# Patient Record
Sex: FEMALE | Race: BLACK OR AFRICAN AMERICAN | NOT HISPANIC OR LATINO
[De-identification: names, ages, dates, MRNs, and addresses within clinical notes are randomized per-mention and may not be internally consistent; named-entity substitution may affect disease eponyms.]

---

## 2017-03-05 ENCOUNTER — RX RENEWAL (OUTPATIENT)
Age: 72
End: 2017-03-05

## 2017-03-28 ENCOUNTER — APPOINTMENT (OUTPATIENT)
Dept: INTERNAL MEDICINE | Facility: CLINIC | Age: 72
End: 2017-03-28

## 2017-03-28 VITALS — SYSTOLIC BLOOD PRESSURE: 140 MMHG | DIASTOLIC BLOOD PRESSURE: 60 MMHG | HEART RATE: 76 BPM | RESPIRATION RATE: 12 BRPM

## 2017-03-28 VITALS — BODY MASS INDEX: 34.32 KG/M2 | WEIGHT: 206 LBS | HEIGHT: 65 IN

## 2017-03-28 DIAGNOSIS — E55.9 VITAMIN D DEFICIENCY, UNSPECIFIED: ICD-10-CM

## 2017-03-29 LAB
ALBUMIN SERPL ELPH-MCNC: 3.8 G/DL
ALP BLD-CCNC: 114 U/L
ALT SERPL-CCNC: 12 U/L
ANION GAP SERPL CALC-SCNC: 16 MMOL/L
AST SERPL-CCNC: 20 U/L
BASOPHILS # BLD AUTO: 0.01 K/UL
BASOPHILS NFR BLD AUTO: 0.2 %
BILIRUB DIRECT SERPL-MCNC: 0.1 MG/DL
BILIRUB INDIRECT SERPL-MCNC: 0.1 MG/DL
BILIRUB SERPL-MCNC: 0.2 MG/DL
BUN SERPL-MCNC: 22 MG/DL
CALCIUM SERPL-MCNC: 8.8 MG/DL
CHLORIDE SERPL-SCNC: 103 MMOL/L
CHOLEST SERPL-MCNC: 126 MG/DL
CHOLEST/HDLC SERPL: 3 RATIO
CO2 SERPL-SCNC: 22 MMOL/L
CREAT SERPL-MCNC: 0.84 MG/DL
EOSINOPHIL # BLD AUTO: 0.06 K/UL
EOSINOPHIL NFR BLD AUTO: 1.4 %
GLUCOSE SERPL-MCNC: 102 MG/DL
HBA1C MFR BLD HPLC: 6.4 %
HCT VFR BLD CALC: 39.4 %
HDLC SERPL-MCNC: 42 MG/DL
HGB BLD-MCNC: 12.2 G/DL
IMM GRANULOCYTES NFR BLD AUTO: 0.2 %
INR PPP: 2.88 RATIO
LDLC SERPL CALC-MCNC: 65 MG/DL
LYMPHOCYTES # BLD AUTO: 1.11 K/UL
LYMPHOCYTES NFR BLD AUTO: 25 %
MAN DIFF?: NORMAL
MCHC RBC-ENTMCNC: 26.9 PG
MCHC RBC-ENTMCNC: 31 GM/DL
MCV RBC AUTO: 87 FL
MONOCYTES # BLD AUTO: 0.4 K/UL
MONOCYTES NFR BLD AUTO: 9 %
NEUTROPHILS # BLD AUTO: 2.85 K/UL
NEUTROPHILS NFR BLD AUTO: 64.2 %
PLATELET # BLD AUTO: 190 K/UL
POTASSIUM SERPL-SCNC: 4.2 MMOL/L
PROT SERPL-MCNC: 6.4 G/DL
PT BLD: 33.3 SEC
RBC # BLD: 4.53 M/UL
RBC # FLD: 15.4 %
SODIUM SERPL-SCNC: 141 MMOL/L
TRIGL SERPL-MCNC: 96 MG/DL
WBC # FLD AUTO: 4.44 K/UL

## 2017-05-01 ENCOUNTER — APPOINTMENT (OUTPATIENT)
Dept: INTERNAL MEDICINE | Facility: CLINIC | Age: 72
End: 2017-05-01

## 2017-07-13 ENCOUNTER — APPOINTMENT (OUTPATIENT)
Dept: INTERNAL MEDICINE | Facility: CLINIC | Age: 72
End: 2017-07-13

## 2017-07-13 ENCOUNTER — RX RENEWAL (OUTPATIENT)
Age: 72
End: 2017-07-13

## 2017-07-13 VITALS — BODY MASS INDEX: 32.99 KG/M2 | WEIGHT: 198 LBS | HEIGHT: 65 IN

## 2017-07-13 VITALS — RESPIRATION RATE: 12 BRPM | DIASTOLIC BLOOD PRESSURE: 56 MMHG | SYSTOLIC BLOOD PRESSURE: 122 MMHG | HEART RATE: 80 BPM

## 2017-09-25 ENCOUNTER — APPOINTMENT (OUTPATIENT)
Dept: INTERNAL MEDICINE | Facility: CLINIC | Age: 72
End: 2017-09-25
Payer: COMMERCIAL

## 2017-09-25 VITALS — HEIGHT: 65 IN | WEIGHT: 200 LBS | BODY MASS INDEX: 33.32 KG/M2

## 2017-09-25 VITALS — RESPIRATION RATE: 12 BRPM | SYSTOLIC BLOOD PRESSURE: 130 MMHG | HEART RATE: 82 BPM | DIASTOLIC BLOOD PRESSURE: 56 MMHG

## 2017-09-25 LAB — INR PPP: 2.2 RATIO

## 2017-09-25 PROCEDURE — 85610 PROTHROMBIN TIME: CPT | Mod: QW

## 2017-09-25 PROCEDURE — 99214 OFFICE O/P EST MOD 30 MIN: CPT | Mod: 25

## 2017-10-08 ENCOUNTER — EMERGENCY (EMERGENCY)
Facility: HOSPITAL | Age: 72
LOS: 0 days | Discharge: HOME | End: 2017-10-08
Attending: EMERGENCY MEDICINE
Payer: MEDICARE

## 2017-10-08 VITALS
SYSTOLIC BLOOD PRESSURE: 145 MMHG | OXYGEN SATURATION: 99 % | RESPIRATION RATE: 17 BRPM | DIASTOLIC BLOOD PRESSURE: 62 MMHG | HEART RATE: 60 BPM | TEMPERATURE: 98 F

## 2017-10-08 VITALS
WEIGHT: 195.11 LBS | DIASTOLIC BLOOD PRESSURE: 71 MMHG | RESPIRATION RATE: 18 BRPM | TEMPERATURE: 99 F | HEIGHT: 65 IN | HEART RATE: 77 BPM | OXYGEN SATURATION: 99 % | SYSTOLIC BLOOD PRESSURE: 159 MMHG

## 2017-10-08 DIAGNOSIS — N39.0 URINARY TRACT INFECTION, SITE NOT SPECIFIED: ICD-10-CM

## 2017-10-08 DIAGNOSIS — R10.9 UNSPECIFIED ABDOMINAL PAIN: ICD-10-CM

## 2017-10-08 LAB
ALBUMIN SERPL ELPH-MCNC: 3.6 G/DL — SIGNIFICANT CHANGE UP (ref 3.3–5)
ALP SERPL-CCNC: 136 U/L — HIGH (ref 40–120)
ALT FLD-CCNC: 19 U/L — SIGNIFICANT CHANGE UP (ref 12–78)
AMYLASE P1 CFR SERPL: 133 U/L — HIGH (ref 25–115)
ANION GAP SERPL CALC-SCNC: 10 MMOL/L — SIGNIFICANT CHANGE UP (ref 5–17)
APPEARANCE UR: CLEAR — SIGNIFICANT CHANGE UP
APTT BLD: 32 SEC — SIGNIFICANT CHANGE UP (ref 27.5–37.4)
AST SERPL-CCNC: 21 U/L — SIGNIFICANT CHANGE UP (ref 15–37)
BACTERIA # UR AUTO: ABNORMAL
BASOPHILS # BLD AUTO: 0.1 K/UL — SIGNIFICANT CHANGE UP (ref 0–0.2)
BASOPHILS NFR BLD AUTO: 0.5 % — SIGNIFICANT CHANGE UP (ref 0–2)
BILIRUB SERPL-MCNC: 0.5 MG/DL — SIGNIFICANT CHANGE UP (ref 0.2–1.2)
BILIRUB UR-MCNC: NEGATIVE — SIGNIFICANT CHANGE UP
BUN SERPL-MCNC: 26 MG/DL — HIGH (ref 7–23)
CALCIUM SERPL-MCNC: 9.4 MG/DL — SIGNIFICANT CHANGE UP (ref 8.5–10.1)
CHLORIDE SERPL-SCNC: 102 MMOL/L — SIGNIFICANT CHANGE UP (ref 96–108)
CO2 SERPL-SCNC: 27 MMOL/L — SIGNIFICANT CHANGE UP (ref 22–31)
COLOR SPEC: YELLOW — SIGNIFICANT CHANGE UP
CREAT SERPL-MCNC: 1.43 MG/DL — HIGH (ref 0.5–1.3)
DIFF PNL FLD: ABNORMAL
EOSINOPHIL # BLD AUTO: 0 K/UL — SIGNIFICANT CHANGE UP (ref 0–0.5)
EOSINOPHIL NFR BLD AUTO: 0 % — SIGNIFICANT CHANGE UP (ref 0–6)
EPI CELLS # UR: SIGNIFICANT CHANGE UP
GLUCOSE SERPL-MCNC: 118 MG/DL — HIGH (ref 70–99)
GLUCOSE UR QL: NEGATIVE MG/DL — SIGNIFICANT CHANGE UP
HCT VFR BLD CALC: 40.7 % — SIGNIFICANT CHANGE UP (ref 34.5–45)
HGB BLD-MCNC: 13.1 G/DL — SIGNIFICANT CHANGE UP (ref 11.5–15.5)
INR BLD: 1.93 RATIO — HIGH (ref 0.88–1.16)
KETONES UR-MCNC: NEGATIVE — SIGNIFICANT CHANGE UP
LEUKOCYTE ESTERASE UR-ACNC: ABNORMAL
LIDOCAIN IGE QN: 272 U/L — SIGNIFICANT CHANGE UP (ref 73–393)
LYMPHOCYTES # BLD AUTO: 1.1 K/UL — SIGNIFICANT CHANGE UP (ref 1–3.3)
LYMPHOCYTES # BLD AUTO: 11.2 % — LOW (ref 13–44)
MCHC RBC-ENTMCNC: 27.7 PG — SIGNIFICANT CHANGE UP (ref 27–34)
MCHC RBC-ENTMCNC: 32.2 GM/DL — SIGNIFICANT CHANGE UP (ref 32–36)
MCV RBC AUTO: 85.9 FL — SIGNIFICANT CHANGE UP (ref 80–100)
MONOCYTES # BLD AUTO: 0.1 K/UL — SIGNIFICANT CHANGE UP (ref 0–0.9)
MONOCYTES NFR BLD AUTO: 1.3 % — LOW (ref 2–14)
NEUTROPHILS # BLD AUTO: 9.1 K/UL — HIGH (ref 1.8–7.4)
NEUTROPHILS NFR BLD AUTO: 86.8 % — HIGH (ref 43–77)
NITRITE UR-MCNC: NEGATIVE — SIGNIFICANT CHANGE UP
PH UR: 6.5 — SIGNIFICANT CHANGE UP (ref 5–8)
PLATELET # BLD AUTO: 234 K/UL — SIGNIFICANT CHANGE UP (ref 150–400)
POTASSIUM SERPL-MCNC: 4.1 MMOL/L — SIGNIFICANT CHANGE UP (ref 3.5–5.3)
POTASSIUM SERPL-SCNC: 4.1 MMOL/L — SIGNIFICANT CHANGE UP (ref 3.5–5.3)
PROT SERPL-MCNC: 8.4 GM/DL — HIGH (ref 6–8.3)
PROT UR-MCNC: 15 MG/DL
PROTHROM AB SERPL-ACNC: 21.3 SEC — HIGH (ref 9.8–12.7)
RBC # BLD: 4.74 M/UL — SIGNIFICANT CHANGE UP (ref 3.8–5.2)
RBC # FLD: 13.8 % — SIGNIFICANT CHANGE UP (ref 11–15)
RBC CASTS # UR COMP ASSIST: SIGNIFICANT CHANGE UP /HPF (ref 0–4)
SODIUM SERPL-SCNC: 139 MMOL/L — SIGNIFICANT CHANGE UP (ref 135–145)
SP GR SPEC: 1.01 — SIGNIFICANT CHANGE UP (ref 1.01–1.02)
UROBILINOGEN FLD QL: NEGATIVE MG/DL — SIGNIFICANT CHANGE UP
WBC # BLD: 10.1 K/UL — SIGNIFICANT CHANGE UP (ref 3.8–10.5)
WBC # FLD AUTO: 10.1 K/UL — SIGNIFICANT CHANGE UP (ref 3.8–10.5)
WBC UR QL: SIGNIFICANT CHANGE UP

## 2017-10-08 PROCEDURE — 74176 CT ABD & PELVIS W/O CONTRAST: CPT | Mod: 26

## 2017-10-08 PROCEDURE — 99285 EMERGENCY DEPT VISIT HI MDM: CPT

## 2017-10-08 RX ORDER — KETOROLAC TROMETHAMINE 30 MG/ML
30 SYRINGE (ML) INJECTION ONCE
Qty: 0 | Refills: 0 | Status: DISCONTINUED | OUTPATIENT
Start: 2017-10-08 | End: 2017-10-08

## 2017-10-08 RX ORDER — SODIUM CHLORIDE 9 MG/ML
1000 INJECTION INTRAMUSCULAR; INTRAVENOUS; SUBCUTANEOUS ONCE
Qty: 0 | Refills: 0 | Status: COMPLETED | OUTPATIENT
Start: 2017-10-08 | End: 2017-10-08

## 2017-10-08 RX ORDER — AZTREONAM 2 G
1 VIAL (EA) INJECTION
Qty: 20 | Refills: 0 | OUTPATIENT
Start: 2017-10-08 | End: 2017-10-18

## 2017-10-08 RX ADMIN — SODIUM CHLORIDE 1000 MILLILITER(S): 9 INJECTION INTRAMUSCULAR; INTRAVENOUS; SUBCUTANEOUS at 18:24

## 2017-10-08 RX ADMIN — Medication 30 MILLIGRAM(S): at 18:34

## 2017-10-08 RX ADMIN — Medication 1 TABLET(S): at 20:46

## 2017-10-08 NOTE — ED ADULT TRIAGE NOTE - CHIEF COMPLAINT QUOTE
pt complaining of left side flank pain radiating to abdomen as well as nausea and vomiting since this am. pt has history of recurring UTI and PE.

## 2017-10-08 NOTE — ED ADULT NURSE NOTE - OBJECTIVE STATEMENT
Patient alert stating that since this morning she has had this pain in her left side that radiates to the middle of her abdomen. States she had a PE in 2007 and is on coumadin ever since prophylactically.

## 2017-10-08 NOTE — ED PROVIDER NOTE - PROGRESS NOTE DETAILS
Pt endorsed by Dr. Conde present for eval of flank pain & has hx of freq UTI.  Pending CT. CT neg for obstructing stone.  Labs neg for acute pathology.  Pt received first dose of abx in ED.  Discussed results and outcome of testing with the patient, given copy as well.  Patient advised to please follow up with their primary care doctor within the next 24 hours and return to the Emergency Department for worsening symptoms or any other concerns.  Patient advised that their doctor may call  to follow up on the specific results of the tests performed today in the emergency department. Given urology follow up.

## 2017-10-08 NOTE — ED PROVIDER NOTE - OBJECTIVE STATEMENT
73 yo female with history of PE x10 years ago, htn presents with left flank pain since this morning. Patient states pain is 4/10 at present. Patient denies fever, chills, rash. Patient with mild dysuria. Patient states pain  radiates to left flank/abdominal area.

## 2017-10-08 NOTE — ED PROVIDER NOTE - CARE PLAN
Principal Discharge DX:	Flank pain Principal Discharge DX:	Flank pain  Secondary Diagnosis:	UTI (urinary tract infection)

## 2017-10-09 LAB
CULTURE RESULTS: SIGNIFICANT CHANGE UP
SPECIMEN SOURCE: SIGNIFICANT CHANGE UP

## 2017-10-10 ENCOUNTER — APPOINTMENT (OUTPATIENT)
Dept: INTERNAL MEDICINE | Facility: CLINIC | Age: 72
End: 2017-10-10
Payer: COMMERCIAL

## 2017-10-10 VITALS — RESPIRATION RATE: 12 BRPM | DIASTOLIC BLOOD PRESSURE: 60 MMHG | SYSTOLIC BLOOD PRESSURE: 152 MMHG | HEART RATE: 78 BPM

## 2017-10-10 VITALS — BODY MASS INDEX: 32.99 KG/M2 | HEIGHT: 65 IN | WEIGHT: 198 LBS

## 2017-10-10 DIAGNOSIS — N39.0 URINARY TRACT INFECTION, SITE NOT SPECIFIED: ICD-10-CM

## 2017-10-10 DIAGNOSIS — R60.9 EDEMA, UNSPECIFIED: ICD-10-CM

## 2017-10-10 PROCEDURE — 99214 OFFICE O/P EST MOD 30 MIN: CPT

## 2017-10-10 PROCEDURE — 85610 PROTHROMBIN TIME: CPT | Mod: QW

## 2017-10-11 ENCOUNTER — RESULT CHARGE (OUTPATIENT)
Age: 72
End: 2017-10-11

## 2017-10-11 LAB — INR PPP: 2 RATIO

## 2017-10-16 ENCOUNTER — APPOINTMENT (OUTPATIENT)
Dept: CT IMAGING | Facility: IMAGING CENTER | Age: 72
End: 2017-10-16

## 2018-05-15 ENCOUNTER — EMERGENCY (EMERGENCY)
Facility: HOSPITAL | Age: 73
LOS: 0 days | Discharge: ANOTHER HOSPITAL | End: 2018-05-15
Attending: EMERGENCY MEDICINE
Payer: MEDICARE

## 2018-05-15 ENCOUNTER — APPOINTMENT (OUTPATIENT)
Dept: INTERNAL MEDICINE | Facility: CLINIC | Age: 73
End: 2018-05-15

## 2018-05-15 ENCOUNTER — INPATIENT (INPATIENT)
Facility: HOSPITAL | Age: 73
LOS: 5 days | Discharge: HOME | DRG: 330 | End: 2018-05-21
Attending: SURGERY | Admitting: SURGERY
Payer: COMMERCIAL

## 2018-05-15 VITALS
HEIGHT: 65 IN | OXYGEN SATURATION: 97 % | RESPIRATION RATE: 18 BRPM | DIASTOLIC BLOOD PRESSURE: 73 MMHG | WEIGHT: 190.04 LBS | TEMPERATURE: 99 F | HEART RATE: 89 BPM | SYSTOLIC BLOOD PRESSURE: 170 MMHG

## 2018-05-15 VITALS
DIASTOLIC BLOOD PRESSURE: 58 MMHG | SYSTOLIC BLOOD PRESSURE: 155 MMHG | RESPIRATION RATE: 17 BRPM | TEMPERATURE: 98 F | HEART RATE: 77 BPM | OXYGEN SATURATION: 98 %

## 2018-05-15 VITALS
OXYGEN SATURATION: 97 % | SYSTOLIC BLOOD PRESSURE: 164 MMHG | TEMPERATURE: 99 F | RESPIRATION RATE: 15 BRPM | DIASTOLIC BLOOD PRESSURE: 71 MMHG | HEART RATE: 82 BPM

## 2018-05-15 DIAGNOSIS — E11.9 TYPE 2 DIABETES MELLITUS WITHOUT COMPLICATIONS: ICD-10-CM

## 2018-05-15 DIAGNOSIS — Z98.890 OTHER SPECIFIED POSTPROCEDURAL STATES: Chronic | ICD-10-CM

## 2018-05-15 LAB
ALBUMIN SERPL ELPH-MCNC: 3.5 G/DL — SIGNIFICANT CHANGE UP (ref 3.3–5)
ALBUMIN SERPL ELPH-MCNC: 3.8 G/DL — SIGNIFICANT CHANGE UP (ref 3.3–5)
ALP SERPL-CCNC: 134 U/L — HIGH (ref 40–120)
ALP SERPL-CCNC: 150 U/L — HIGH (ref 40–120)
ALT FLD-CCNC: 13 U/L — SIGNIFICANT CHANGE UP (ref 10–45)
ALT FLD-CCNC: 15 U/L — SIGNIFICANT CHANGE UP (ref 12–78)
ANION GAP SERPL CALC-SCNC: 12 MMOL/L — SIGNIFICANT CHANGE UP (ref 5–17)
ANION GAP SERPL CALC-SCNC: 20 MMOL/L — HIGH (ref 5–17)
APPEARANCE UR: CLEAR — SIGNIFICANT CHANGE UP
APTT BLD: 32.5 SEC — SIGNIFICANT CHANGE UP (ref 27.5–37.4)
APTT BLD: 37.3 SEC — SIGNIFICANT CHANGE UP (ref 27.5–37.4)
AST SERPL-CCNC: 18 U/L — SIGNIFICANT CHANGE UP (ref 15–37)
AST SERPL-CCNC: 26 U/L — SIGNIFICANT CHANGE UP (ref 10–40)
BASE EXCESS BLDA CALC-SCNC: -1.1 MMOL/L — SIGNIFICANT CHANGE UP (ref -2–2)
BASOPHILS # BLD AUTO: 0.02 K/UL — SIGNIFICANT CHANGE UP (ref 0–0.2)
BASOPHILS # BLD AUTO: 0.1 K/UL — SIGNIFICANT CHANGE UP (ref 0–0.2)
BASOPHILS NFR BLD AUTO: 0.2 % — SIGNIFICANT CHANGE UP (ref 0–2)
BASOPHILS NFR BLD AUTO: 1.2 % — SIGNIFICANT CHANGE UP (ref 0–2)
BILIRUB SERPL-MCNC: 0.4 MG/DL — SIGNIFICANT CHANGE UP (ref 0.2–1.2)
BILIRUB SERPL-MCNC: 0.5 MG/DL — SIGNIFICANT CHANGE UP (ref 0.2–1.2)
BILIRUB UR-MCNC: NEGATIVE — SIGNIFICANT CHANGE UP
BLD GP AB SCN SERPL QL: NEGATIVE — SIGNIFICANT CHANGE UP
BLOOD GAS COMMENTS: SIGNIFICANT CHANGE UP
BLOOD GAS COMMENTS: SIGNIFICANT CHANGE UP
BLOOD GAS SOURCE: SIGNIFICANT CHANGE UP
BUN SERPL-MCNC: 23 MG/DL — SIGNIFICANT CHANGE UP (ref 7–23)
BUN SERPL-MCNC: 25 MG/DL — HIGH (ref 7–23)
CALCIUM SERPL-MCNC: 9.1 MG/DL — SIGNIFICANT CHANGE UP (ref 8.5–10.1)
CALCIUM SERPL-MCNC: 9.5 MG/DL — SIGNIFICANT CHANGE UP (ref 8.4–10.5)
CHLORIDE SERPL-SCNC: 107 MMOL/L — SIGNIFICANT CHANGE UP (ref 96–108)
CHLORIDE SERPL-SCNC: 99 MMOL/L — SIGNIFICANT CHANGE UP (ref 96–108)
CK MB BLD-MCNC: 2.7 % — SIGNIFICANT CHANGE UP (ref 0–3.5)
CK MB CFR SERPL CALC: 1.6 NG/ML — SIGNIFICANT CHANGE UP (ref 0.5–3.6)
CK MB CFR SERPL CALC: 1.9 NG/ML — SIGNIFICANT CHANGE UP (ref 0.5–3.6)
CK SERPL-CCNC: 70 U/L — SIGNIFICANT CHANGE UP (ref 26–192)
CO2 SERPL-SCNC: 20 MMOL/L — LOW (ref 22–31)
CO2 SERPL-SCNC: 22 MMOL/L — SIGNIFICANT CHANGE UP (ref 22–31)
COLOR SPEC: YELLOW — SIGNIFICANT CHANGE UP
CREAT SERPL-MCNC: 1.22 MG/DL — SIGNIFICANT CHANGE UP (ref 0.5–1.3)
CREAT SERPL-MCNC: 1.25 MG/DL — SIGNIFICANT CHANGE UP (ref 0.5–1.3)
D DIMER BLD IA.RAPID-MCNC: 954 NG/ML DDU — HIGH
DIFF PNL FLD: ABNORMAL
EOSINOPHIL # BLD AUTO: 0 K/UL — SIGNIFICANT CHANGE UP (ref 0–0.5)
EOSINOPHIL # BLD AUTO: 0.02 K/UL — SIGNIFICANT CHANGE UP (ref 0–0.5)
EOSINOPHIL NFR BLD AUTO: 0.1 % — SIGNIFICANT CHANGE UP (ref 0–6)
EOSINOPHIL NFR BLD AUTO: 0.2 % — SIGNIFICANT CHANGE UP (ref 0–6)
EPI CELLS # UR: SIGNIFICANT CHANGE UP
GAS PNL BLDV: SIGNIFICANT CHANGE UP
GLUCOSE SERPL-MCNC: 117 MG/DL — HIGH (ref 70–99)
GLUCOSE SERPL-MCNC: 93 MG/DL — SIGNIFICANT CHANGE UP (ref 70–99)
GLUCOSE UR QL: NEGATIVE MG/DL — SIGNIFICANT CHANGE UP
HCO3 BLDA-SCNC: 23 MMOL/L — SIGNIFICANT CHANGE UP (ref 21–29)
HCT VFR BLD CALC: 40.7 % — SIGNIFICANT CHANGE UP (ref 34.5–45)
HCT VFR BLD CALC: 41.5 % — SIGNIFICANT CHANGE UP (ref 34.5–45)
HGB BLD-MCNC: 13.2 G/DL — SIGNIFICANT CHANGE UP (ref 11.5–15.5)
HGB BLD-MCNC: 13.4 G/DL — SIGNIFICANT CHANGE UP (ref 11.5–15.5)
HOROWITZ INDEX BLDA+IHG-RTO: 21 — SIGNIFICANT CHANGE UP
IMM GRANULOCYTES NFR BLD AUTO: 0.2 % — SIGNIFICANT CHANGE UP (ref 0–1.5)
INR BLD: 2.51 RATIO — HIGH (ref 0.88–1.16)
INR BLD: 3.51 RATIO — HIGH (ref 0.88–1.16)
KETONES UR-MCNC: ABNORMAL
LEUKOCYTE ESTERASE UR-ACNC: ABNORMAL
LYMPHOCYTES # BLD AUTO: 0.86 K/UL — LOW (ref 1–3.3)
LYMPHOCYTES # BLD AUTO: 1 K/UL — SIGNIFICANT CHANGE UP (ref 1–3.3)
LYMPHOCYTES # BLD AUTO: 10.5 % — LOW (ref 13–44)
LYMPHOCYTES # BLD AUTO: 12.5 % — LOW (ref 13–44)
MAGNESIUM SERPL-MCNC: 2.1 MG/DL — SIGNIFICANT CHANGE UP (ref 1.6–2.6)
MCHC RBC-ENTMCNC: 27.7 PG — SIGNIFICANT CHANGE UP (ref 27–34)
MCHC RBC-ENTMCNC: 28.2 PG — SIGNIFICANT CHANGE UP (ref 27–34)
MCHC RBC-ENTMCNC: 32.2 GM/DL — SIGNIFICANT CHANGE UP (ref 32–36)
MCHC RBC-ENTMCNC: 32.4 GM/DL — SIGNIFICANT CHANGE UP (ref 32–36)
MCV RBC AUTO: 85.3 FL — SIGNIFICANT CHANGE UP (ref 80–100)
MCV RBC AUTO: 87.5 FL — SIGNIFICANT CHANGE UP (ref 80–100)
MONOCYTES # BLD AUTO: 0.64 K/UL — SIGNIFICANT CHANGE UP (ref 0–0.9)
MONOCYTES # BLD AUTO: 0.8 K/UL — SIGNIFICANT CHANGE UP (ref 0–0.9)
MONOCYTES NFR BLD AUTO: 10.1 % — SIGNIFICANT CHANGE UP (ref 2–14)
MONOCYTES NFR BLD AUTO: 7.8 % — SIGNIFICANT CHANGE UP (ref 2–14)
NEUTROPHILS # BLD AUTO: 5.8 K/UL — SIGNIFICANT CHANGE UP (ref 1.8–7.4)
NEUTROPHILS # BLD AUTO: 6.6 K/UL — SIGNIFICANT CHANGE UP (ref 1.8–7.4)
NEUTROPHILS NFR BLD AUTO: 76 % — SIGNIFICANT CHANGE UP (ref 43–77)
NEUTROPHILS NFR BLD AUTO: 81.1 % — HIGH (ref 43–77)
NITRITE UR-MCNC: NEGATIVE — SIGNIFICANT CHANGE UP
NRBC # BLD: 0 /100 WBCS — SIGNIFICANT CHANGE UP (ref 0–0)
NT-PROBNP SERPL-SCNC: 506 PG/ML — HIGH (ref 0–450)
PCO2 BLDA: 38 MMHG — SIGNIFICANT CHANGE UP (ref 32–46)
PH BLD: 7.4 — SIGNIFICANT CHANGE UP (ref 7.35–7.45)
PH UR: 6 — SIGNIFICANT CHANGE UP (ref 5–8)
PLATELET # BLD AUTO: 194 K/UL — SIGNIFICANT CHANGE UP (ref 150–400)
PLATELET # BLD AUTO: 279 K/UL — SIGNIFICANT CHANGE UP (ref 150–400)
PO2 BLDA: 74 MMHG — SIGNIFICANT CHANGE UP (ref 74–108)
POTASSIUM SERPL-MCNC: 3.4 MMOL/L — LOW (ref 3.5–5.3)
POTASSIUM SERPL-MCNC: 3.9 MMOL/L — SIGNIFICANT CHANGE UP (ref 3.5–5.3)
POTASSIUM SERPL-SCNC: 3.4 MMOL/L — LOW (ref 3.5–5.3)
POTASSIUM SERPL-SCNC: 3.9 MMOL/L — SIGNIFICANT CHANGE UP (ref 3.5–5.3)
PROT SERPL-MCNC: 7.7 G/DL — SIGNIFICANT CHANGE UP (ref 6–8.3)
PROT SERPL-MCNC: 7.8 GM/DL — SIGNIFICANT CHANGE UP (ref 6–8.3)
PROT UR-MCNC: 30 MG/DL
PROTHROM AB SERPL-ACNC: 27.9 SEC — HIGH (ref 9.8–12.7)
PROTHROM AB SERPL-ACNC: 38.9 SEC — HIGH (ref 9.8–12.7)
RBC # BLD: 4.74 M/UL — SIGNIFICANT CHANGE UP (ref 3.8–5.2)
RBC # BLD: 4.77 M/UL — SIGNIFICANT CHANGE UP (ref 3.8–5.2)
RBC # FLD: 13.5 % — SIGNIFICANT CHANGE UP (ref 10.3–14.5)
RBC # FLD: 14.9 % — HIGH (ref 10.3–14.5)
RBC CASTS # UR COMP ASSIST: ABNORMAL /HPF (ref 0–4)
RH IG SCN BLD-IMP: POSITIVE — SIGNIFICANT CHANGE UP
RH IG SCN BLD-IMP: POSITIVE — SIGNIFICANT CHANGE UP
SAO2 % BLDA: 95 % — SIGNIFICANT CHANGE UP (ref 92–96)
SODIUM SERPL-SCNC: 139 MMOL/L — SIGNIFICANT CHANGE UP (ref 135–145)
SODIUM SERPL-SCNC: 141 MMOL/L — SIGNIFICANT CHANGE UP (ref 135–145)
SP GR SPEC: 1.01 — SIGNIFICANT CHANGE UP (ref 1.01–1.02)
TROPONIN I SERPL-MCNC: <.015 NG/ML — SIGNIFICANT CHANGE UP (ref 0.01–0.04)
TROPONIN I SERPL-MCNC: <.015 NG/ML — SIGNIFICANT CHANGE UP (ref 0.01–0.04)
UROBILINOGEN FLD QL: NEGATIVE MG/DL — SIGNIFICANT CHANGE UP
WBC # BLD: 7.6 K/UL — SIGNIFICANT CHANGE UP (ref 3.8–10.5)
WBC # BLD: 8.16 K/UL — SIGNIFICANT CHANGE UP (ref 3.8–10.5)
WBC # FLD AUTO: 7.6 K/UL — SIGNIFICANT CHANGE UP (ref 3.8–10.5)
WBC # FLD AUTO: 8.16 K/UL — SIGNIFICANT CHANGE UP (ref 3.8–10.5)
WBC UR QL: SIGNIFICANT CHANGE UP

## 2018-05-15 PROCEDURE — 99285 EMERGENCY DEPT VISIT HI MDM: CPT

## 2018-05-15 PROCEDURE — 99285 EMERGENCY DEPT VISIT HI MDM: CPT | Mod: 25

## 2018-05-15 PROCEDURE — 74176 CT ABD & PELVIS W/O CONTRAST: CPT | Mod: 26

## 2018-05-15 PROCEDURE — 93010 ELECTROCARDIOGRAM REPORT: CPT

## 2018-05-15 PROCEDURE — 71045 X-RAY EXAM CHEST 1 VIEW: CPT | Mod: 26

## 2018-05-15 PROCEDURE — 99053 MED SERV 10PM-8AM 24 HR FAC: CPT

## 2018-05-15 PROCEDURE — 71275 CT ANGIOGRAPHY CHEST: CPT | Mod: 26

## 2018-05-15 RX ORDER — PANTOPRAZOLE SODIUM 20 MG/1
40 TABLET, DELAYED RELEASE ORAL ONCE
Qty: 0 | Refills: 0 | Status: COMPLETED | OUTPATIENT
Start: 2018-05-15 | End: 2018-05-15

## 2018-05-15 RX ORDER — SODIUM CHLORIDE 9 MG/ML
1000 INJECTION INTRAMUSCULAR; INTRAVENOUS; SUBCUTANEOUS ONCE
Qty: 0 | Refills: 0 | Status: COMPLETED | OUTPATIENT
Start: 2018-05-15 | End: 2018-05-15

## 2018-05-15 RX ORDER — TRIAMTERENE 100 MG/1
0 CAPSULE ORAL
Qty: 0 | Refills: 0 | COMMUNITY

## 2018-05-15 RX ORDER — DEXTROSE 50 % IN WATER 50 %
15 SYRINGE (ML) INTRAVENOUS ONCE
Qty: 0 | Refills: 0 | Status: DISCONTINUED | OUTPATIENT
Start: 2018-05-15 | End: 2018-05-17

## 2018-05-15 RX ORDER — IOHEXOL 300 MG/ML
30 INJECTION, SOLUTION INTRAVENOUS ONCE
Qty: 0 | Refills: 0 | Status: COMPLETED | OUTPATIENT
Start: 2018-05-15 | End: 2018-05-15

## 2018-05-15 RX ORDER — GLUCAGON INJECTION, SOLUTION 0.5 MG/.1ML
1 INJECTION, SOLUTION SUBCUTANEOUS ONCE
Qty: 0 | Refills: 0 | Status: DISCONTINUED | OUTPATIENT
Start: 2018-05-15 | End: 2018-05-17

## 2018-05-15 RX ORDER — DEXTROSE 50 % IN WATER 50 %
12.5 SYRINGE (ML) INTRAVENOUS ONCE
Qty: 0 | Refills: 0 | Status: DISCONTINUED | OUTPATIENT
Start: 2018-05-15 | End: 2018-05-17

## 2018-05-15 RX ORDER — SODIUM CHLORIDE 9 MG/ML
1000 INJECTION, SOLUTION INTRAVENOUS
Qty: 0 | Refills: 0 | Status: DISCONTINUED | OUTPATIENT
Start: 2018-05-15 | End: 2018-05-16

## 2018-05-15 RX ORDER — SODIUM CHLORIDE 9 MG/ML
1000 INJECTION, SOLUTION INTRAVENOUS
Qty: 0 | Refills: 0 | Status: DISCONTINUED | OUTPATIENT
Start: 2018-05-15 | End: 2018-05-17

## 2018-05-15 RX ORDER — INSULIN LISPRO 100/ML
VIAL (ML) SUBCUTANEOUS EVERY 6 HOURS
Qty: 0 | Refills: 0 | Status: DISCONTINUED | OUTPATIENT
Start: 2018-05-15 | End: 2018-05-17

## 2018-05-15 RX ADMIN — PANTOPRAZOLE SODIUM 40 MILLIGRAM(S): 20 TABLET, DELAYED RELEASE ORAL at 20:15

## 2018-05-15 RX ADMIN — IOHEXOL 30 MILLILITER(S): 300 INJECTION, SOLUTION INTRAVENOUS at 10:54

## 2018-05-15 RX ADMIN — SODIUM CHLORIDE 100 MILLILITER(S): 9 INJECTION, SOLUTION INTRAVENOUS at 23:35

## 2018-05-15 RX ADMIN — SODIUM CHLORIDE 1000 MILLILITER(S): 9 INJECTION INTRAMUSCULAR; INTRAVENOUS; SUBCUTANEOUS at 20:15

## 2018-05-15 NOTE — ED PROVIDER NOTE - MEDICAL DECISION MAKING DETAILS
patient pw shortness of breath. though it is unlikely this is another pe, given patient reports identical symptoms. will scan her again. patient pw shortness of breath. though it is unlikely this is another pe, given patient reports identical symptoms. will scan her again. As interpreted by ED physician, ECG is NSR with normal intervals/axis, no changes in QRS, no ST/T changes.

## 2018-05-15 NOTE — ED ADULT NURSE NOTE - CHPI ED SYMPTOMS NEG
no burning urination/no nausea/no fever/no dysuria/no blood in stool/no hematuria/no diarrhea/no vomiting/no chills

## 2018-05-15 NOTE — ED PROVIDER NOTE - CARE PLAN
Principal Discharge DX:	Shortness of breath Principal Discharge DX:	Shortness of breath  Secondary Diagnosis:	Small bowel obstruction

## 2018-05-15 NOTE — ED ADULT NURSE NOTE - OBJECTIVE STATEMENT
72 yo transferred from [facility] for SBO. PMH of DM (II) & PE (2007). Pt reported to [facility] yesterday for SOB and abdominal pain. Pt reports "I have not eaten in 4 days". 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 Small pelvic and perihepatic ascites". Pt AAOx3, NAD, abdomen soft tender in the umbilical region, distended, strong peripheral pulses x 4, cap refill < 2 seconds, skin warm and dry. Pt denies headache, dizziness, chest pain, palpitations, cough, SOB, n/v/d, urinary symptoms, fevers, chills, weakness at this time. 20 guage in LAC placed prior to arrival, flushes with no difficulty, no blood return. 18 gauge established in the RAC. Pt awaiting medical evaluating by Surgical team. Safety & comfort measures maintained. Will continue to reassess.

## 2018-05-15 NOTE — H&P ADULT - ASSESSMENT
73 year old female with 2 days of abdominal distention, decreased BMs but passing flatus, WBC 8, lactate 2.1, CT showing distended colon from diverticular stricture or mass  - admit to colorectal surgery Dr Hutton for partial LBO  - NPO, IVF  - NGT if patient becomes nauseous  - Will monitor serial abd exams  - Trend WBC, lactate  - GI consult  - Check CEA  - D/w'ed fellow Shoaib Fitzgerald   Red Surgery 0930

## 2018-05-15 NOTE — ED PROVIDER NOTE - PHYSICAL EXAMINATION
Belkys Garcia M.D.:   patient awake alert NAD .   LUNGS CTAB no wheeze no crackle.   CARD RRR no m/r/g.    Abdomen soft mildly tender diffusely, distended no rebound no guarding no CVA tenderness.   EXT WWP no edema no calf tenderness CV 2+DP/PT bilaterally.   neuro A&Ox3 gait normal.    skin warm and dry no rash  HEENT: moist mucous membranes, PERRL, EOMI

## 2018-05-15 NOTE — ED PROVIDER NOTE - MEDICAL DECISION MAKING DETAILS
73F presenting as tranfer for LBO. hemodynamically stable at this time without N/V or signs of ischemic gut. will resend labs here as no lactate ever sent, will consult surgery and admit.

## 2018-05-15 NOTE — ED PROVIDER NOTE - OBJECTIVE STATEMENT
Belkys Garcia M.D: 73F hx DM, remote hx PE presenting as transfer from [place] for LBO. presented w/ SOBxdays similar to prior PE. ahd CTA which was negative. was reassessed and found to have periumbilical abd pain and abd distension and was rescanned and foundto have LBO. pt notes a few days of abd pain as well, diffuse, constant, assocaited with constipation- last BM 2 days ago. has been passing gas. notes some reflux but denies n/v. no f/c. Belkys Garcia M.D: 73F hx DM, remote hx PE presenting as transfer from [place] for LBO. presented w/ SOBxdays similar to prior PE. ahd CTA which was negative. was reassessed and found to have periumbilical abd pain and abd distension and was rescanned and foundto have LBO. pt notes a few days of abd pain as well, diffuse, constant, assocaited with constipation- last BM 2 days ago. has been passing gas. notes some reflux but denies n/v. no f/c.    Maik MRN: [id number]  CT under that MRN

## 2018-05-15 NOTE — H&P ADULT - FAMILY HISTORY
Father  Still living? Unknown  Family history of brain cancer, Age at diagnosis: Age Unknown     Mother  Still living? Unknown  Family history of breast cancer, Age at diagnosis: Age Unknown

## 2018-05-15 NOTE — H&P ADULT - ATTENDING COMMENTS
Pt seen and examined  P/w c/o abd distention. Last colonoscopy was 10 years ago. She has refused them since due to the prep. 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 She states that she had surgery for it as well as chemo and radiation therapy at [facility]. She usually has 3-4 BMs per day. She is currently passing gas.   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     Upon discussion with her PCP Dr. Samson and her son, neither of them have any knowledge of a cancer history or of any treatment for such. Her PE was presumed to be an unprovoked PE which is why she is currently on coumadin.     FamHx sig for her son with factor 5 leiden deficiency.     AAOx3  abd soft, distended, NT    CT with LBO at rectosigmoid stricture with fistulization to the adnexa.     A/P LBO - malignancy vs diverticular.   for scope today by GI  NPO, IVF  Heme eval for need for coumadin

## 2018-05-15 NOTE — H&P ADULT - HISTORY OF PRESENT ILLNESS
GENERAL SURGERY NOTE    CC: Patient is a 73 year old female with history of PE on coumadin who presents with a chief complaint of abdominal distention. She initially presented to an OSH with complaint of SOB; CTPA was negative. CT Abd/Pelvis was obtained to evaluate for finding of abdominal tenderness and distention which revealed a rectosigmoid stricture. The patient reports that she began to feel distended 2 days ago, associated with decreased appetite. She felt mildly nauseated without vomiting. Currently denies pain. Passing flatus. Last BM 2 days ago, though baseline is 3-4/day. Denies ever having any similar episodes in the past. Denies any history of diverticulosis or diverticulitis. Last c-scope was approx 2005, but she notes that she has "trouble" with c-scopes and tolerating the prep.       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      PSH  as above    10-point review of history otherwise negative unless indicated in HPI    FAMILY HISTORY  Father: brain cancer  Mother: breast cancer    MEDS  Coumadin 5 mg Mon-Thurs, 2.5 mg Fri-Sun    ALLERGIES  No Known Allergies or Intolerances

## 2018-05-15 NOTE — ED ADULT TRIAGE NOTE - CHIEF COMPLAINT QUOTE
pt c/o difficulty breathing, acid reflux, unable to eat in 2 days.  pt stated " I think I'm having a pulmonary embolism".  PMH - PE (2007)

## 2018-05-15 NOTE — ED PROVIDER NOTE - PROGRESS NOTE DETAILS
case discussed with surgery- will come see pt. Attending MD Munoz: Spoke with surgery, pending discussion with attending, will await final recs

## 2018-05-15 NOTE — ED ADULT TRIAGE NOTE - HEART RATE (BEATS/MIN)
Benefits, risks, and possible complications of procedure explained to patient/caregiver who verbalized understanding and gave verbal consent.
89

## 2018-05-15 NOTE — ED PROVIDER NOTE - ATTENDING CONTRIBUTION TO CARE
Attending MD Munoz: I personally have seen and examined this patient.  Resident note reviewed and agree on plan of care and except where noted.  See below for details.     73F with PMH including DM, PE not on AC presents to the ED transferred from [place] for large bowel obstruction.  Reports has been having shortness of breath, reports felt similar to prior PE.  CTA negative.  Had decreased appetite, periumbilical pain, abdominal distention and found to have LBO.  Reports abdominal pain diffuse, constant. Reports last BM was two days ago, +flatus. Reports mild nausea, denies vomiting.  Denies fevers, chills.  Denies chest pain.  On exam, head NCAT, PERRL, FROM at neck, no tenderness to palpation or stepoffs along length of spine, lungs CTAB with good inspiratory effort, +S1S2, no m/r/g, abdomen soft with +BS, +diffuse abdominal tenderness, +distention, no rebound, no guarding, no CVAT, moving all extremities; A/P: 73F with known LBO on CT, will send labs, keep npo, IVFs, consult surgery, admit

## 2018-05-15 NOTE — H&P ADULT - PMH
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

## 2018-05-15 NOTE — H&P ADULT - NSHPLABSRESULTS_GEN_ALL_CORE
Labs:                        13.4   7.6   )-----------( 194      ( 15 May 2018 20:13 )             41.5     05-15    139  |  99  |  25<H>  ----------------------------<  93  3.9   |  20<L>  |  1.22    Ca    9.5      15 May 2018 20:13    TPro  7.7  /  Alb  3.8  /  TBili  0.4  /  DBili  x   /  AST  26  /  ALT  13  /  AlkPhos  134<H>  05-15    PT/INR - ( 15 May 2018 20:13 )   PT: 38.9 sec;   INR: 3.51 ratio         PTT - ( 15 May 2018 20:13 )  PTT:37.3 sec          Imaging

## 2018-05-15 NOTE — ED ADULT NURSE REASSESSMENT NOTE - NS ED NURSE REASSESS COMMENT FT1
Surgery @ bedside evaluating the patient
Pt AAOx4, NAD, resp nonlabored, resting comfortably in bed. Pt denies headache, dizziness, chest pain, palpitations, SOB, abd pain, n/v/d, urinary symptoms, fevers, chills, weakness at this time. Pt awaiting transport to [address] [place] [address], bed [address] Door. Safety maintained.

## 2018-05-15 NOTE — ED PROVIDER NOTE - PROGRESS NOTE DETAILS
Pt signed out to me from dr medina, pt presented c/o sob x 1 day, pt has h/o PE and feels similar symptoms, ct angio results pending, ce #2 is pending, pt appears in no distress pt re-assessed, now c/o abdominal tenderness especially over the umbilical area, on exam, pt is tender with palpation over the umbilicus, pt has h/o a hernia, ct ordered to rule out incarceration dr dagmar higgins ct shows a large bowel obstruction, npo ordered dr troy called, case discussed, recommends pt be transferred for workup, he states pt may need a stent or colostomy and workup for malignancy transfer center called, case discussed with Dr Norman (general surgery on call), states she will call back and poor connection patient accepted by Dr Norman, ER to ER transfer, report given to Dr Harmon in the ER pt re-assessed, now c/o abdominal tenderness especially over the umbilical area, on exam, pt is tender with palpation over the umbilicus, states that she has not been able to eat since Saturday, pt has h/o a hernia, ct ordered to rule out incarceration

## 2018-05-15 NOTE — ED ADULT NURSE NOTE - OBJECTIVE STATEMENT
Patient presents in ED with lower abdominal pain 7/10,SOB, and nausea, for the past couple of weeks. It has since intensified. Hasd not been able to eat since 5/11

## 2018-05-15 NOTE — H&P ADULT - NSHPPHYSICALEXAM_GEN_ALL_CORE
T(C): 37.3 (05-15-18 @ 19:46), Max: 37.3 (05-15-18 @ 19:19)  HR: 82 (05-15-18 @ 19:46) (82 - 82)  BP: 156/66 (05-15-18 @ 19:46) (156/66 - 164/71)  RR: 16 (05-15-18 @ 19:46) (15 - 16)  SpO2: 97% (05-15-18 @ 19:46) (97% - 97%)  Wt(kg): --  Tmax: T(C): , Max: 37.3 (05-15-18 @ 19:19)  Wt(kg): --    Gen: NAD  HEENT: normocephalic, atraumatic, no scleral icterus  CV: S1, S2, RRR  Pulm: CTA B/L  Abd: Soft, obese, moderately distended, mildly tender, non-localized, no rebound, no guarding, no palpable organomegaly/masses  : grossly normal perineum  Ext: warm, no edema

## 2018-05-16 ENCOUNTER — TRANSCRIPTION ENCOUNTER (OUTPATIENT)
Age: 73
End: 2018-05-16

## 2018-05-16 ENCOUNTER — RESULT REVIEW (OUTPATIENT)
Age: 73
End: 2018-05-16

## 2018-05-16 DIAGNOSIS — I26.99 OTHER PULMONARY EMBOLISM WITHOUT ACUTE COR PULMONALE: ICD-10-CM

## 2018-05-16 DIAGNOSIS — Z79.01 LONG TERM (CURRENT) USE OF ANTICOAGULANTS: ICD-10-CM

## 2018-05-16 DIAGNOSIS — K56.609 UNSPECIFIED INTESTINAL OBSTRUCTION, UNSPECIFIED AS TO PARTIAL VERSUS COMPLETE OBSTRUCTION: ICD-10-CM

## 2018-05-16 DIAGNOSIS — R06.02 SHORTNESS OF BREATH: ICD-10-CM

## 2018-05-16 LAB
ANION GAP SERPL CALC-SCNC: 16 MMOL/L — SIGNIFICANT CHANGE UP (ref 5–17)
APTT BLD: 27.6 SEC — SIGNIFICANT CHANGE UP (ref 27.5–37.4)
APTT BLD: 35.4 SEC — SIGNIFICANT CHANGE UP (ref 27.5–37.4)
BUN SERPL-MCNC: 23 MG/DL — SIGNIFICANT CHANGE UP (ref 7–23)
CALCIUM SERPL-MCNC: 8.8 MG/DL — SIGNIFICANT CHANGE UP (ref 8.4–10.5)
CHLORIDE SERPL-SCNC: 104 MMOL/L — SIGNIFICANT CHANGE UP (ref 96–108)
CO2 SERPL-SCNC: 21 MMOL/L — LOW (ref 22–31)
CREAT SERPL-MCNC: 1.19 MG/DL — SIGNIFICANT CHANGE UP (ref 0.5–1.3)
GLUCOSE BLDC GLUCOMTR-MCNC: 88 MG/DL — SIGNIFICANT CHANGE UP (ref 70–99)
GLUCOSE BLDC GLUCOMTR-MCNC: 92 MG/DL — SIGNIFICANT CHANGE UP (ref 70–99)
GLUCOSE BLDC GLUCOMTR-MCNC: 95 MG/DL — SIGNIFICANT CHANGE UP (ref 70–99)
GLUCOSE BLDC GLUCOMTR-MCNC: 98 MG/DL — SIGNIFICANT CHANGE UP (ref 70–99)
GLUCOSE SERPL-MCNC: 85 MG/DL — SIGNIFICANT CHANGE UP (ref 70–99)
HBA1C BLD-MCNC: 5.7 % — HIGH (ref 4–5.6)
HCT VFR BLD CALC: 35.6 % — SIGNIFICANT CHANGE UP (ref 34.5–45)
HGB BLD-MCNC: 11.5 G/DL — SIGNIFICANT CHANGE UP (ref 11.5–15.5)
INR BLD: 2.19 RATIO — HIGH (ref 0.88–1.16)
INR BLD: 4.03 RATIO — HIGH (ref 0.88–1.16)
MAGNESIUM SERPL-MCNC: 2 MG/DL — SIGNIFICANT CHANGE UP (ref 1.6–2.6)
MCHC RBC-ENTMCNC: 27.7 PG — SIGNIFICANT CHANGE UP (ref 27–34)
MCHC RBC-ENTMCNC: 32.3 GM/DL — SIGNIFICANT CHANGE UP (ref 32–36)
MCV RBC AUTO: 85.8 FL — SIGNIFICANT CHANGE UP (ref 80–100)
PHOSPHATE SERPL-MCNC: 3 MG/DL — SIGNIFICANT CHANGE UP (ref 2.5–4.5)
PLATELET # BLD AUTO: 225 K/UL — SIGNIFICANT CHANGE UP (ref 150–400)
POTASSIUM SERPL-MCNC: 3.3 MMOL/L — LOW (ref 3.5–5.3)
POTASSIUM SERPL-SCNC: 3.3 MMOL/L — LOW (ref 3.5–5.3)
PROTHROM AB SERPL-ACNC: 24.3 SEC — HIGH (ref 9.8–12.7)
PROTHROM AB SERPL-ACNC: 46.8 SEC — HIGH (ref 10–13.1)
RBC # BLD: 4.15 M/UL — SIGNIFICANT CHANGE UP (ref 3.8–5.2)
RBC # FLD: 15.1 % — HIGH (ref 10.3–14.5)
SODIUM SERPL-SCNC: 141 MMOL/L — SIGNIFICANT CHANGE UP (ref 135–145)
WBC # BLD: 6.37 K/UL — SIGNIFICANT CHANGE UP (ref 3.8–10.5)
WBC # FLD AUTO: 6.37 K/UL — SIGNIFICANT CHANGE UP (ref 3.8–10.5)

## 2018-05-16 PROCEDURE — 45331 SIGMOIDOSCOPY AND BIOPSY: CPT | Mod: GC

## 2018-05-16 PROCEDURE — 88305 TISSUE EXAM BY PATHOLOGIST: CPT | Mod: 26

## 2018-05-16 PROCEDURE — 99223 1ST HOSP IP/OBS HIGH 75: CPT | Mod: 57

## 2018-05-16 RX ORDER — DEXTROSE MONOHYDRATE, SODIUM CHLORIDE, AND POTASSIUM CHLORIDE 50; .745; 4.5 G/1000ML; G/1000ML; G/1000ML
1000 INJECTION, SOLUTION INTRAVENOUS
Qty: 0 | Refills: 0 | Status: DISCONTINUED | OUTPATIENT
Start: 2018-05-16 | End: 2018-05-17

## 2018-05-16 RX ORDER — PROTHROMBIN COMPLEX CONCENTRATE (HUMAN) 25.5; 16.5; 24; 22; 22; 26 [IU]/ML; [IU]/ML; [IU]/ML; [IU]/ML; [IU]/ML; [IU]/ML
2000 POWDER, FOR SOLUTION INTRAVENOUS ONCE
Qty: 0 | Refills: 0 | Status: DISCONTINUED | OUTPATIENT
Start: 2018-05-16 | End: 2018-05-16

## 2018-05-16 RX ORDER — PHYTONADIONE (VIT K1) 5 MG
10 TABLET ORAL ONCE
Qty: 0 | Refills: 0 | Status: COMPLETED | OUTPATIENT
Start: 2018-05-16 | End: 2018-05-16

## 2018-05-16 RX ORDER — PHYTONADIONE (VIT K1) 5 MG
5 TABLET ORAL ONCE
Qty: 0 | Refills: 0 | Status: COMPLETED | OUTPATIENT
Start: 2018-05-16 | End: 2018-05-16

## 2018-05-16 RX ORDER — ACETAMINOPHEN 500 MG
1000 TABLET ORAL ONCE
Qty: 0 | Refills: 0 | Status: COMPLETED | OUTPATIENT
Start: 2018-05-16 | End: 2018-05-16

## 2018-05-16 RX ORDER — POTASSIUM CHLORIDE 20 MEQ
10 PACKET (EA) ORAL
Qty: 0 | Refills: 0 | Status: COMPLETED | OUTPATIENT
Start: 2018-05-16 | End: 2018-05-16

## 2018-05-16 RX ADMIN — DEXTROSE MONOHYDRATE, SODIUM CHLORIDE, AND POTASSIUM CHLORIDE 100 MILLILITER(S): 50; .745; 4.5 INJECTION, SOLUTION INTRAVENOUS at 18:44

## 2018-05-16 RX ADMIN — Medication 400 MILLIGRAM(S): at 20:37

## 2018-05-16 RX ADMIN — Medication 1000 MILLIGRAM(S): at 21:07

## 2018-05-16 RX ADMIN — Medication 101 MILLIGRAM(S): at 20:58

## 2018-05-16 RX ADMIN — Medication 100 MILLIEQUIVALENT(S): at 18:40

## 2018-05-16 RX ADMIN — Medication 100 MILLIEQUIVALENT(S): at 09:52

## 2018-05-16 RX ADMIN — Medication 102 MILLIGRAM(S): at 10:30

## 2018-05-16 RX ADMIN — Medication 100 MILLIEQUIVALENT(S): at 13:39

## 2018-05-16 NOTE — CONSULT NOTE ADULT - ASSESSMENT
Impression:  1. Abdominal pain/distension - due to large bowel obstruction - differential includes colon cancer, benign diverticular stricture, metastatic disease to the olon  2. Remote PE still on anticoagulation    Recommend:  -Will review films with radiology  -The patient will need colonoscopy with possible stent placement  -Given supertherapeutic INR, would reverse INR as we plan for possible procedure  -Keep NPO Impression:  1. Abdominal pain/distension - due to large bowel obstruction - differential includes colon cancer, benign diverticular stricture, metastatic disease to the colon, vs IBD given fistulas? seen on CT  2. Remote PE still on anticoagulation    Recommend:  -Will review films with radiology  -The patient will need colonoscopy with possible stent placement  -Given supertherapeutic INR, would reverse INR as we plan for possible procedure  -Keep NPO

## 2018-05-16 NOTE — CONSULT NOTE ADULT - PROBLEM SELECTOR RECOMMENDATION 9
Hold coumadin.   Vitamin K for INR reversal.   Heparin drip when subtherapeutic tulio-operatively.   Patient without active clot, unclear hypercoagulable state. Reasonable to keep anticoagulated in the setting of likely malignancy, but will discuss with PCP.

## 2018-05-16 NOTE — PROGRESS NOTE ADULT - SUBJECTIVE AND OBJECTIVE BOX
Surgery Red Team Progress Note    Presented with partial LBO, HD2    SUBJECTIVE:   Patient was seen and examined this morning. There was no acute event overnight. She is resting comfortably in bed. Pain is well controlled. Patient does not have flatus or bowel movement. She does not report pain, fever, n/v, chest pain, or shortness of breath.     OBJECTIVE:   T(C): 37.1 (05-16-18 @ 09:19), Max: 37.4 (05-15-18 @ 10:47)  HR: 81 (05-16-18 @ 09:19) (70 - 82)  BP: 162/71 (05-16-18 @ 09:19) (141/64 - 164/71)  RR: 18 (05-16-18 @ 09:19) (15 - 18)  SpO2: 96% (05-16-18 @ 09:19) (96% - 98%)  Wt(kg): --  CAPILLARY BLOOD GLUCOSE      POCT Blood Glucose.: 88 mg/dL (16 May 2018 06:07)  POCT Blood Glucose.: 92 mg/dL (16 May 2018 00:31)    I&O's Detail    15 May 2018 07:01  -  16 May 2018 07:00  --------------------------------------------------------  IN:    lactated ringers.: 850 mL  Total IN: 850 mL    OUT:    Voided: 200 mL  Total OUT: 200 mL    Total NET: 650 mL      16 May 2018 07:01  -  16 May 2018 09:41  --------------------------------------------------------  IN:  Total IN: 0 mL    OUT:  Total OUT: 0 mL    Total NET: 0 mL        PHYSICAL EXAM:  Gen: Well-nourished, well-developed, A&O x3, resting in bed in no acute distress  CV: RRR  Resp: Patent airways, unlabored   Abdomen: Soft, obese, moderately distended, mildly tender, non-localized, no rebound, no guarding,  Extremities: All 4 extremities warm and well perfused, no edema

## 2018-05-16 NOTE — CONSULT NOTE ADULT - SUBJECTIVE AND OBJECTIVE BOX
[facility]ED ENDOSCOPY CONSULT      Chief Complaint:  Patient is a 73y old  Female who presents with a chief complaint of abdominal distention (16 May 2018 05:52)      HPI: 73 female with remote PE in 2007 still on anticoagulation, presents with abdominal pain.     Allergies:  No Known Allergies      Home Medications:    Hospital Medications:  dextrose 40% Gel 15 Gram(s) Oral once PRN  dextrose 5%. 1000 milliLiter(s) IV Continuous <Continuous>  dextrose 50% Injectable 12.5 Gram(s) IV Push once  glucagon  Injectable 1 milliGRAM(s) IntraMuscular once PRN  insulin lispro (HumaLOG) corrective regimen sliding scale   SubCutaneous every 6 hours  lactated ringers. 1000 milliLiter(s) IV Continuous <Continuous>  potassium chloride  10 mEq/100 mL IVPB 10 milliEquivalent(s) IV Intermittent every 1 hour      PMHX/PSHX:  Pulmonary embolism  DM (diabetes mellitus)  Diabetes 1.5, managed as type 2  History of genitourinary surgery  No significant past surgical history      Family history:  Family history of breast cancer (Mother)  Family history of brain cancer (Father)  No pertinent family history in first degree relatives      Social History:     ROS:     General:  No wt loss, fevers, chills, night sweats, fatigue,   Eyes:  Good vision, no reported pain  ENT:  No sore throat, pain, runny nose, dysphagia  CV:  No pain, palpitations, hypo/hypertension  Resp:  No dyspnea, cough, tachypnea, wheezing  GI:  See HPI  :  No pain, bleeding, incontinence, nocturia  Muscle:  No pain, weakness  Neuro:  No weakness, tingling, memory problems  Psych:  No fatigue, insomnia, mood problems, depression  Endocrine:  No polyuria, polydipsia, cold/heat intolerance  Heme:  No petechiae, ecchymosis, easy bruisability  Skin:  No rash, edema      PHYSICAL EXAM:     GENERAL:  Appears stated age, well-groomed, well-nourished, no distress  HEENT:  NC/AT,  conjunctivae clear and pink,  no JVD,   CHEST:  Full & symmetric excursion, no increased effort, breath sounds clear  HEART:  Regular rhythm, S1, S2, no murmur/rub/S3/S4, no abdominal bruit, no edema  ABDOMEN:  Soft, non-tender, non-distended, normoactive bowel sounds,  no masses ,  EXTREMITIES:  no cyanosis,clubbing or edema  SKIN:  No rash/erythema/ecchymoses/petechiae/wounds/abscess/warm/dry  NEURO:  Alert, oriented,     Vital Signs:  Vital Signs Last 24 Hrs  T(C): 37.2 (16 May 2018 05:45), Max: 37.4 (15 May 2018 10:47)  T(F): 98.9 (16 May 2018 05:45), Max: 99.4 (15 May 2018 10:47)  HR: 70 (16 May 2018 05:45) (70 - 82)  BP: 154/71 (16 May 2018 05:45) (141/64 - 164/71)  BP(mean): --  RR: 18 (16 May 2018 05:45) (15 - 18)  SpO2: 97% (16 May 2018 05:45) (96% - 98%)  Daily Height in cm: 165.1 (15 May 2018 23:24)    Daily     LABS:                        11.5   6.37  )-----------( 225      ( 16 May 2018 07:54 )             35.6     05-16    141  |  104  |  23  ----------------------------<  85  3.3<L>   |  21<L>  |  1.19    Ca    8.8      16 May 2018 07:06  Phos  3.0     05-16  Mg     2.0     -16    TPro  7.7  /  Alb  3.8  /  TBili  0.4  /  DBili  x   /  AST  26  /  ALT  13  /  AlkPhos  134<H>  05-15    LIVER FUNCTIONS - ( 15 May 2018 20:13 )  Alb: 3.8 g/dL / Pro: 7.7 g/dL / ALK PHOS: 134 U/L / ALT: 13 U/L / AST: 26 U/L / GGT: x           PT/INR - ( 16 May 2018 07:48 )   PT: 46.8 sec;   INR: 4.03 ratio         PTT - ( 16 May 2018 07:48 )  PTT:35.4 sec  Urinalysis Basic - ( 15 May 2018 08:20 )    Color: Yellow / Appearance: Clear / S.010 / pH: x  Gluc: x / Ketone: Small  / Bili: Negative / Urobili: Negative mg/dL   Blood: x / Protein: 30 mg/dL / Nitrite: Negative   Leuk Esterase: Small / RBC: 11-25 /HPF / WBC 3-5   Sq Epi: x / Non Sq Epi: Few / Bacteria: x          Imaging: [facility] ENDOSCOPY CONSULT      Chief Complaint:  Patient is a 73y old  Female who presents with a chief complaint of abdominal distention (16 May 2018 05:52)      HPI: 73 female with remote PE in  still on anticoagulation, presents with abdominal pain. The pain started 2 days ago near her umbilicus, and was intermittent. She reports nausea but no vomiting. Her abdomen feels and looks bigger to her. She has not had a bowel movement for 3 days, but reports passing gas. She has never had the same symptoms before. She denies any fevers/chills, no abnormal weight loss. She was still eating normally and having a good appetite despite her symptoms. She had 2 colonoscopies last of which was , but she does not know what was found. She has never been told she had diverticulosis or diverticulitis.    Allergies:  No Known Allergies      Hospital Medications:  dextrose 40% Gel 15 Gram(s) Oral once PRN  dextrose 5%. 1000 milliLiter(s) IV Continuous <Continuous>  dextrose 50% Injectable 12.5 Gram(s) IV Push once  glucagon  Injectable 1 milliGRAM(s) IntraMuscular once PRN  insulin lispro (HumaLOG) corrective regimen sliding scale   SubCutaneous every 6 hours  lactated ringers. 1000 milliLiter(s) IV Continuous <Continuous>  potassium chloride  10 mEq/100 mL IVPB 10 milliEquivalent(s) IV Intermittent every 1 hour      PMHX/PSHX:  Pulmonary embolism  DM (diabetes mellitus)  Diabetes 1.5, managed as type 2  History of genitourinary surgery  No significant past surgical history      Family history:  Family history of breast cancer (Mother)  Family history of brain cancer (Father)  No colon cancer in family      Social History: No ETOH, smoking or illicit drugs    ROS:     General:  No wt loss, fevers, chills, night sweats, fatigue,   Eyes:  Good vision, no reported pain  ENT:  No sore throat, pain, runny nose, dysphagia  CV:  No pain, palpitations, hypo/hypertension  Resp:  No dyspnea, cough, tachypnea, wheezing  GI:  See HPI  :  No pain, bleeding, incontinence, nocturia  Muscle:  No pain, weakness  Neuro:  No weakness, tingling, memory problems  Psych:  No fatigue, insomnia, mood problems, depression  Endocrine:  No polyuria, polydipsia, cold/heat intolerance  Heme:  No petechiae, ecchymosis, easy bruisability  Skin:  No rash, edema      PHYSICAL EXAM:     GENERAL:  Appears stated age, well-groomed, well-nourished, no distress  HEENT:  NC/AT,  conjunctivae clear and pink,  no JVD,   CHEST:  Full & symmetric excursion, no increased effort, breath sounds clear  HEART:  Regular rhythm, S1, S2, no murmur  ABDOMEN:  Soft, mildly tender diffusely, no rebound/guarding, mild-moderate distension, normoactive bowel sounds,  EXTREMITIES:  no cyanosis,clubbing or edema  SKIN:  No rash/erythema  NEURO:  Alert, oriented x 3    Vital Signs:  Vital Signs Last 24 Hrs  T(C): 37.2 (16 May 2018 05:45), Max: 37.4 (15 May 2018 10:47)  T(F): 98.9 (16 May 2018 05:45), Max: 99.4 (15 May 2018 10:47)  HR: 70 (16 May 2018 05:45) (70 - 82)  BP: 154/71 (16 May 2018 05:45) (141/64 - 164/71)  BP(mean): --  RR: 18 (16 May 2018 05:45) (15 - 18)  SpO2: 97% (16 May 2018 05:45) (96% - 98%)  Daily Height in cm: 165.1 (15 May 2018 23:24)    Daily     LABS:                        11.5   6.37  )-----------( 225      ( 16 May 2018 07:54 )             35.6     -16    141  |  104  |  23  ----------------------------<  85  3.3<L>   |  21<L>  |  1.19    Ca    8.8      16 May 2018 07:06  Phos  3.0     05-16  Mg     2.0     -16    TPro  7.7  /  Alb  3.8  /  TBili  0.4  /  DBili  x   /  AST  26  /  ALT  13  /  AlkPhos  134<H>  05-15    LIVER FUNCTIONS - ( 15 May 2018 20:13 )  Alb: 3.8 g/dL / Pro: 7.7 g/dL / ALK PHOS: 134 U/L / ALT: 13 U/L / AST: 26 U/L / GGT: x           PT/INR - ( 16 May 2018 07:48 )   PT: 46.8 sec;   INR: 4.03 ratio         PTT - ( 16 May 2018 07:48 )  PTT:35.4 sec  Urinalysis Basic - ( 15 May 2018 08:20 )    Color: Yellow / Appearance: Clear / S.010 / pH: x  Gluc: x / Ketone: Small  / Bili: Negative / Urobili: Negative mg/dL   Blood: x / Protein: 30 mg/dL / Nitrite: Negative   Leuk Esterase: Small / RBC: 11-25 /HPF / WBC 3-5   Sq Epi: x / Non Sq Epi: Few / Bacteria: x          Imaging:      < from: CT Abdomen and Pelvis w/ Oral Cont (05.15.18 @ 13:49) >  FINDINGS:    LOWER CHEST: Cardiomegaly    ABDOMEN:  LIVER: within normal limits  BILE DUCTS: normal caliber  GALLBLADDER: Cholelithiasis  PANCREAS: 1.5 cm cystic lesion at the tail, stable and 80 further   characterized with contrast-enhanced MRI.  SPLEEN: within normal limits  ADRENALS: within normal limits  KIDNEYS: Retained contrast in the right collecting system. Moderate left   hydroureteronephrosis and renal atrophy, stable. Right renal cyst.    PELVIS:  REPRODUCTIVE ORGANS: 1 cm right adnexal cyst.  BLADDER: Excreted intravenous contrast    BOWEL: Stricture ormass in the rectosigmoid colon with upstream colonic   distention. Mild small bowel distention. 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  PERITONEUM: Small perihepatic and pelvic ascites.  RETROPERITONEUM: no enlarged retroperitoneal or pelvic nodes.    VESSELS: Diffuse extensive calcification.  ABDOMINAL WALL: within normal limits.  MUSCULOSKELETAL: within normal limits.    IMPRESSION:     Large bowel obstruction due to malignant versus diverticular rectosigmoid   stricture. 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 Small   pelvic and perihepatic ascites.    Other incidental findings as above.    < end of copied text >

## 2018-05-16 NOTE — CONSULT NOTE ADULT - ASSESSMENT
73 year old female with history of PE in 2007 and still on coumadin who presents with a chief complaint of abdominal distention found with colonic obstruction with stricture suspicious of malignancy.

## 2018-05-16 NOTE — PATIENT PROFILE ADULT. - ABILITY TO HEAR (WITH HEARING AID OR HEARING APPLIANCE IF NORMALLY USED):
pt has b/l hearing aids/Mildly to Moderately Impaired: difficulty hearing in some environments or speaker may need to increase volume or speak distinctly

## 2018-05-16 NOTE — PROGRESS NOTE ADULT - ASSESSMENT
Ms. Gee is a 73 year old patient presented with partial LBO. She  is hemodynamically stable. labs are significant for supra therapeutic INR (4.03) and     - receiving Kcentra to reverse anticoagulation in order to proceed with GI colonoscopy and stent placement   - Will f/u with hem/onc regarding indication for A/C therapy   - Diet: NPO  - Type and screen  - patient is requesting to sign DNR form   - Glycemic control  - Pain control  - Monitor GI function  - Replete electrolyte as necessary  - Activity: OOb as tolerated

## 2018-05-16 NOTE — CHART NOTE - NSCHARTNOTEFT_GEN_A_CORE
Spoke with pt's PMD, Dr. Martín Samson ([phone]), who has been pt's PMD for >20 years.  Per Dr. Samson, pt had unprovoked PE in 2007 and has been on anticoagulation since that time.  He is unaware of any malignancy in pt's past.    MARLEY Peña PA-C , pager # [phone]

## 2018-05-16 NOTE — CONSULT NOTE ADULT - SUBJECTIVE AND OBJECTIVE BOX
HPI:  73 year old female with history of PE in 2007 and still on coumadin who presents with a chief complaint of abdominal distention. She initially presented to an OSH with complaint of SOB; CTPA was negative. CT Abd/Pelvis was obtained to evaluate for finding of abdominal tenderness and distention which revealed a rectosigmoid stricture. The patient reports that she began to feel distended 2 days ago, associated with decreased appetite. She felt mildly nauseated without vomiting. Currently denies pain. Passing flatus. Last BM 2 days ago, though baseline is 3-4/day. Denies ever having any similar episodes in the past. Denies any history of diverticulosis or diverticulitis. Last c-scope was approx 2005, but she notes that she has "trouble" with c-scopes and tolerating the prep.     She is not sure if she had any work up after her pulmonary embolus in 2007, and she reports it was not in the setting of any prolonged flights or car drives, not in the setting of surgery, and not in the setting of cancer. She did have "something" removed from her genital area in 2004 but she is not sure of what it was. Her son has factor V leiden with clots and is on coumadin.       PAST MEDICAL & SURGICAL HISTORY:  Injury of right brachial plexus, sequela  Pulmonary embolism: 2007  DM (diabetes mellitus)  Diabetes 1.5, managed as type 2  History of genitourinary surgery: 2004, s/p chemoXRT      Allergies    No Known Allergies    Intolerances        MEDICATIONS  (STANDING):  acetaminophen  IVPB. 1000 milliGRAM(s) IV Intermittent once  dextrose 5% + sodium chloride 0.45% with potassium chloride 20 mEq/L 1000 milliLiter(s) (100 mL/Hr) IV Continuous <Continuous>  dextrose 5%. 1000 milliLiter(s) (50 mL/Hr) IV Continuous <Continuous>  dextrose 50% Injectable 12.5 Gram(s) IV Push once  insulin lispro (HumaLOG) corrective regimen sliding scale   SubCutaneous every 6 hours    MEDICATIONS  (PRN):  dextrose 40% Gel 15 Gram(s) Oral once PRN Blood Glucose LESS THAN 70 milliGRAM(s)/deciliter  glucagon  Injectable 1 milliGRAM(s) IntraMuscular once PRN Glucose LESS THAN 70 milligrams/deciliter      FAMILY HISTORY:  Family history of breast cancer (Mother)  Family history of brain cancer (Father)      SOCIAL HISTORY: No EtOH, no tobacco    REVIEW OF SYSTEMS:    CONSTITUTIONAL: No weakness, fevers or chills  EYES/ENT: No visual changes;  No vertigo or throat pain   NECK: No pain or stiffness  RESPIRATORY: No cough, wheezing, hemoptysis; No shortness of breath  CARDIOVASCULAR: No chest pain or palpitations  GASTROINTESTINAL: SEE HPI  GENITOURINARY: No dysuria, frequency or hematuria  NEUROLOGICAL: No numbness or weakness  SKIN: No itching, burning, rashes, or lesions   All other review of systems is negative unless indicated above.    Height (cm): 165.1 (05-15 @ 23:24)  Weight (kg): 91.6 (05-15 @ 23:24)  BMI (kg/m2): 33.6 (05-15 @ 23:24)  BSA (m2): 1.99 (05-15 @ 23:24)    T(F): 98.2 (05-16-18 @ 18:53), Max: 99.2 (05-15-18 @ 19:46)  HR: 80 (05-16-18 @ 18:53)  BP: 166/72 (05-16-18 @ 18:53)  RR: 18 (05-16-18 @ 18:53)  SpO2: 92% (05-16-18 @ 18:53)  Wt(kg): --    GENERAL: NAD, well-developed  HEAD:  Atraumatic, Normocephalic  EYES: EOMI, PERRLA, conjunctiva and sclera clear  NECK: Supple, No JVD  CHEST/LUNG: Clear to auscultation bilaterally; No wheeze  HEART: Regular rate and rhythm; No murmurs, rubs, or gallops  ABDOMEN: Soft, Nontender, Nondistended; Bowel sounds present  EXTREMITIES:  2+ Peripheral Pulses, No clubbing, cyanosis, or edema  NEUROLOGY: non-focal  SKIN: No rashes or lesions                          11.5   6.37  )-----------( 225      ( 16 May 2018 07:54 )             35.6       05-16    141  |  104  |  23  ----------------------------<  85  3.3<L>   |  21<L>  |  1.19    Ca    8.8      16 May 2018 07:06  Phos  3.0     05-16  Mg     2.0     05-16    TPro  7.7  /  Alb  3.8  /  TBili  0.4  /  DBili  x   /  AST  26  /  ALT  13  /  AlkPhos  134<H>  05-15      Magnesium, Serum: 2.0 mg/dL (05-16 @ 07:06)  Phosphorus Level, Serum: 3.0 mg/dL (05-16 @ 07:06)      PT/INR - ( 16 May 2018 14:25 )   PT: 24.3 sec;   INR: 2.19 ratio         PTT - ( 16 May 2018 14:25 )  PTT:27.6 sec

## 2018-05-17 ENCOUNTER — RESULT REVIEW (OUTPATIENT)
Age: 73
End: 2018-05-17

## 2018-05-17 LAB
ANION GAP SERPL CALC-SCNC: 15 MMOL/L — SIGNIFICANT CHANGE UP (ref 5–17)
APTT BLD: 165.9 SEC — CRITICAL HIGH (ref 27.5–37.4)
APTT BLD: 24.1 SEC — LOW (ref 27.5–37.4)
APTT BLD: 25 SEC — LOW (ref 27.5–37.4)
BLD GP AB SCN SERPL QL: NEGATIVE — SIGNIFICANT CHANGE UP
BUN SERPL-MCNC: 28 MG/DL — HIGH (ref 7–23)
CALCIUM SERPL-MCNC: 9.4 MG/DL — SIGNIFICANT CHANGE UP (ref 8.4–10.5)
CEA SERPL-MCNC: 3.2 NG/ML — SIGNIFICANT CHANGE UP (ref 0–3.8)
CHLORIDE SERPL-SCNC: 104 MMOL/L — SIGNIFICANT CHANGE UP (ref 96–108)
CO2 SERPL-SCNC: 20 MMOL/L — LOW (ref 22–31)
CREAT SERPL-MCNC: 1.16 MG/DL — SIGNIFICANT CHANGE UP (ref 0.5–1.3)
GLUCOSE BLDC GLUCOMTR-MCNC: 101 MG/DL — HIGH (ref 70–99)
GLUCOSE BLDC GLUCOMTR-MCNC: 121 MG/DL — HIGH (ref 70–99)
GLUCOSE BLDC GLUCOMTR-MCNC: 137 MG/DL — HIGH (ref 70–99)
GLUCOSE BLDC GLUCOMTR-MCNC: 74 MG/DL — SIGNIFICANT CHANGE UP (ref 70–99)
GLUCOSE BLDC GLUCOMTR-MCNC: 93 MG/DL — SIGNIFICANT CHANGE UP (ref 70–99)
GLUCOSE SERPL-MCNC: 142 MG/DL — HIGH (ref 70–99)
HCT VFR BLD CALC: 38 % — SIGNIFICANT CHANGE UP (ref 34.5–45)
HGB BLD-MCNC: 12.8 G/DL — SIGNIFICANT CHANGE UP (ref 11.5–15.5)
INR BLD: 1.18 RATIO — HIGH (ref 0.88–1.16)
INR BLD: 1.39 RATIO — HIGH (ref 0.88–1.16)
MAGNESIUM SERPL-MCNC: 2.1 MG/DL — SIGNIFICANT CHANGE UP (ref 1.6–2.6)
MCHC RBC-ENTMCNC: 28 PG — SIGNIFICANT CHANGE UP (ref 27–34)
MCHC RBC-ENTMCNC: 33.7 GM/DL — SIGNIFICANT CHANGE UP (ref 32–36)
MCV RBC AUTO: 83.2 FL — SIGNIFICANT CHANGE UP (ref 80–100)
PHOSPHATE SERPL-MCNC: 2.9 MG/DL — SIGNIFICANT CHANGE UP (ref 2.5–4.5)
PLATELET # BLD AUTO: 249 K/UL — SIGNIFICANT CHANGE UP (ref 150–400)
POTASSIUM SERPL-MCNC: 4.6 MMOL/L — SIGNIFICANT CHANGE UP (ref 3.5–5.3)
POTASSIUM SERPL-SCNC: 4.6 MMOL/L — SIGNIFICANT CHANGE UP (ref 3.5–5.3)
PROTHROM AB SERPL-ACNC: 13.4 SEC — HIGH (ref 10–13.1)
PROTHROM AB SERPL-ACNC: 15.2 SEC — HIGH (ref 9.8–12.7)
RBC # BLD: 4.57 M/UL — SIGNIFICANT CHANGE UP (ref 3.8–5.2)
RBC # FLD: 15.4 % — HIGH (ref 10.3–14.5)
RH IG SCN BLD-IMP: POSITIVE — SIGNIFICANT CHANGE UP
SODIUM SERPL-SCNC: 139 MMOL/L — SIGNIFICANT CHANGE UP (ref 135–145)
SURGICAL PATHOLOGY STUDY: SIGNIFICANT CHANGE UP
WBC # BLD: 13.16 K/UL — HIGH (ref 3.8–10.5)
WBC # FLD AUTO: 13.16 K/UL — HIGH (ref 3.8–10.5)

## 2018-05-17 PROCEDURE — 44320 COLOSTOMY: CPT

## 2018-05-17 PROCEDURE — 49320 DIAG LAPARO SEPARATE PROC: CPT | Mod: 59

## 2018-05-17 PROCEDURE — 88305 TISSUE EXAM BY PATHOLOGIST: CPT | Mod: 26

## 2018-05-17 PROCEDURE — 88342 IMHCHEM/IMCYTCHM 1ST ANTB: CPT | Mod: 26

## 2018-05-17 PROCEDURE — 88112 CYTOPATH CELL ENHANCE TECH: CPT | Mod: 26

## 2018-05-17 PROCEDURE — 88341 IMHCHEM/IMCYTCHM EA ADD ANTB: CPT | Mod: 26

## 2018-05-17 RX ORDER — DEXTROSE 50 % IN WATER 50 %
15 SYRINGE (ML) INTRAVENOUS ONCE
Qty: 0 | Refills: 0 | Status: DISCONTINUED | OUTPATIENT
Start: 2018-05-17 | End: 2018-05-21

## 2018-05-17 RX ORDER — SODIUM CHLORIDE 9 MG/ML
1000 INJECTION, SOLUTION INTRAVENOUS
Qty: 0 | Refills: 0 | Status: DISCONTINUED | OUTPATIENT
Start: 2018-05-17 | End: 2018-05-18

## 2018-05-17 RX ORDER — GLUCAGON INJECTION, SOLUTION 0.5 MG/.1ML
1 INJECTION, SOLUTION SUBCUTANEOUS ONCE
Qty: 0 | Refills: 0 | Status: DISCONTINUED | OUTPATIENT
Start: 2018-05-17 | End: 2018-05-21

## 2018-05-17 RX ORDER — ACETAMINOPHEN 500 MG
1000 TABLET ORAL ONCE
Qty: 0 | Refills: 0 | Status: COMPLETED | OUTPATIENT
Start: 2018-05-17 | End: 2018-05-17

## 2018-05-17 RX ORDER — HEPARIN SODIUM 5000 [USP'U]/ML
INJECTION INTRAVENOUS; SUBCUTANEOUS
Qty: 25000 | Refills: 0 | Status: DISCONTINUED | OUTPATIENT
Start: 2018-05-17 | End: 2018-05-17

## 2018-05-17 RX ORDER — HYDROMORPHONE HYDROCHLORIDE 2 MG/ML
0.5 INJECTION INTRAMUSCULAR; INTRAVENOUS; SUBCUTANEOUS
Qty: 0 | Refills: 0 | Status: DISCONTINUED | OUTPATIENT
Start: 2018-05-17 | End: 2018-05-17

## 2018-05-17 RX ORDER — DEXTROSE 50 % IN WATER 50 %
12.5 SYRINGE (ML) INTRAVENOUS ONCE
Qty: 0 | Refills: 0 | Status: DISCONTINUED | OUTPATIENT
Start: 2018-05-17 | End: 2018-05-21

## 2018-05-17 RX ORDER — INSULIN LISPRO 100/ML
VIAL (ML) SUBCUTANEOUS AT BEDTIME
Qty: 0 | Refills: 0 | Status: DISCONTINUED | OUTPATIENT
Start: 2018-05-17 | End: 2018-05-18

## 2018-05-17 RX ORDER — HEPARIN SODIUM 5000 [USP'U]/ML
1700 INJECTION INTRAVENOUS; SUBCUTANEOUS
Qty: 25000 | Refills: 0 | Status: DISCONTINUED | OUTPATIENT
Start: 2018-05-17 | End: 2018-05-17

## 2018-05-17 RX ORDER — DEXTROSE 50 % IN WATER 50 %
25 SYRINGE (ML) INTRAVENOUS ONCE
Qty: 0 | Refills: 0 | Status: DISCONTINUED | OUTPATIENT
Start: 2018-05-17 | End: 2018-05-21

## 2018-05-17 RX ORDER — SODIUM CHLORIDE 9 MG/ML
1000 INJECTION, SOLUTION INTRAVENOUS
Qty: 0 | Refills: 0 | Status: DISCONTINUED | OUTPATIENT
Start: 2018-05-17 | End: 2018-05-21

## 2018-05-17 RX ORDER — HEPARIN SODIUM 5000 [USP'U]/ML
1400 INJECTION INTRAVENOUS; SUBCUTANEOUS
Qty: 25000 | Refills: 0 | Status: DISCONTINUED | OUTPATIENT
Start: 2018-05-17 | End: 2018-05-18

## 2018-05-17 RX ORDER — HEPARIN SODIUM 5000 [USP'U]/ML
17 INJECTION INTRAVENOUS; SUBCUTANEOUS
Qty: 25000 | Refills: 0 | Status: DISCONTINUED | OUTPATIENT
Start: 2018-05-17 | End: 2018-05-17

## 2018-05-17 RX ORDER — HYDROMORPHONE HYDROCHLORIDE 2 MG/ML
0.25 INJECTION INTRAMUSCULAR; INTRAVENOUS; SUBCUTANEOUS
Qty: 0 | Refills: 0 | Status: DISCONTINUED | OUTPATIENT
Start: 2018-05-17 | End: 2018-05-17

## 2018-05-17 RX ORDER — ACETAMINOPHEN 500 MG
1000 TABLET ORAL ONCE
Qty: 0 | Refills: 0 | Status: COMPLETED | OUTPATIENT
Start: 2018-05-18 | End: 2018-05-18

## 2018-05-17 RX ORDER — ENOXAPARIN SODIUM 100 MG/ML
40 INJECTION SUBCUTANEOUS EVERY 24 HOURS
Qty: 0 | Refills: 0 | Status: DISCONTINUED | OUTPATIENT
Start: 2018-05-17 | End: 2018-05-17

## 2018-05-17 RX ORDER — ONDANSETRON 8 MG/1
4 TABLET, FILM COATED ORAL ONCE
Qty: 0 | Refills: 0 | Status: DISCONTINUED | OUTPATIENT
Start: 2018-05-17 | End: 2018-05-17

## 2018-05-17 RX ORDER — INSULIN LISPRO 100/ML
VIAL (ML) SUBCUTANEOUS
Qty: 0 | Refills: 0 | Status: DISCONTINUED | OUTPATIENT
Start: 2018-05-17 | End: 2018-05-18

## 2018-05-17 RX ADMIN — Medication 400 MILLIGRAM(S): at 21:40

## 2018-05-17 RX ADMIN — SODIUM CHLORIDE 70 MILLILITER(S): 9 INJECTION, SOLUTION INTRAVENOUS at 11:00

## 2018-05-17 RX ADMIN — Medication 400 MILLIGRAM(S): at 16:08

## 2018-05-17 RX ADMIN — Medication 1000 MILLIGRAM(S): at 17:28

## 2018-05-17 RX ADMIN — Medication 1000 MILLIGRAM(S): at 21:10

## 2018-05-17 RX ADMIN — HEPARIN SODIUM 17 UNIT(S)/HR: 5000 INJECTION INTRAVENOUS; SUBCUTANEOUS at 16:06

## 2018-05-17 NOTE — BRIEF OPERATIVE NOTE - OPERATION/FINDINGS
Copious straw-colored ascites evacuated upon entry into peritoneum and sent for cytology. Small and large bowel were both noted to be distended with the transverse colon massively dilated and obscuring the view into the upper abdomen. Peritoneal implants were found in the lower abdomen and also above the liver. Biopsies were taken. The sigmoid colon was not clearly visualized with dilated small bowel so decision was made to do a loop transverse colostomy. Left paramedial incision made in the upper abdomen through which the transverse colon was brought through. At least 3 fingerbreadths allowed through opening to allow for massively dilated colon. Lap sites closed; 12 mm umbilical port site closed with 0-Vicryl figure of 8 and all sites stapled close. Colotomy was made and colon content suctioned with notable decompression of abdominal distention. Colostomy matured in usual fashion.

## 2018-05-17 NOTE — CONSULT NOTE ADULT - CONSULT REASON
Hx of peritoneal cancer, intraop consult to evaluate for pelvic pathology Hx of peritoneal cancer, intraop consult to evaluate for complex pelvic fistula from the colon to the adnexa and uterus seen on CT A/P Hx of peroneal cancer, intraop consult to evaluate for complex pelvic fistula from the colon to the adnexa and uterus seen on CT A/P

## 2018-05-17 NOTE — CHART NOTE - NSCHARTNOTEFT_GEN_A_CORE
GENERAL SURGERY POST-OP NOTE:     Status post: transverse loop colostomy    Subjective:  feels ok  pain controlled  has not had anything to drink yet  denies N/V      Objective:  NAD  abd softly distended, NT  port sites C/D/I  colostomy with gas and liquid stool    MEDICATIONS  (STANDING):  acetaminophen  IVPB. 1000 milliGRAM(s) IV Intermittent once  acetaminophen  IVPB. 1000 milliGRAM(s) IV Intermittent once  dextrose 5%. 1000 milliLiter(s) (50 mL/Hr) IV Continuous <Continuous>  dextrose 50% Injectable 12.5 Gram(s) IV Push once  dextrose 50% Injectable 25 Gram(s) IV Push once  dextrose 50% Injectable 25 Gram(s) IV Push once  enoxaparin Injectable 40 milliGRAM(s) SubCutaneous every 24 hours  insulin lispro (HumaLOG) corrective regimen sliding scale   SubCutaneous three times a day before meals  insulin lispro (HumaLOG) corrective regimen sliding scale   SubCutaneous at bedtime  lactated ringers. 1000 milliLiter(s) (125 mL/Hr) IV Continuous <Continuous>    MEDICATIONS  (PRN):  dextrose 40% Gel 15 Gram(s) Oral once PRN Blood Glucose LESS THAN 70 milliGRAM(s)/deciliter  glucagon  Injectable 1 milliGRAM(s) IntraMuscular once PRN Glucose LESS THAN 70 milligrams/deciliter      Vital Signs Last 24 Hrs  T(C): 36.9 (17 May 2018 13:50), Max: 36.9 (17 May 2018 12:00)  T(F): 98.5 (17 May 2018 13:50), Max: 98.5 (17 May 2018 13:50)  HR: 86 (17 May 2018 13:50) (58 - 90)  BP: 125/76 (17 May 2018 13:50) (125/76 - 166/72)  BP(mean): 88 (17 May 2018 12:45) (86 - 98)  RR: 19 (17 May 2018 13:50) (15 - 19)  SpO2: 94% (17 May 2018 13:50) (92% - 100%)    I&O's Detail    16 May 2018 07:01  -  17 May 2018 07:00  --------------------------------------------------------  IN:    dextrose 5% + sodium chloride 0.45% with potassium chloride 20 mEq/L: 1200 mL  Total IN: 1200 mL    OUT:    Voided: 150 mL  Total OUT: 150 mL    Total NET: 1050 mL      17 May 2018 07:01  -  17 May 2018 14:21  --------------------------------------------------------  IN:    lactated ringers.: 140 mL  Total IN: 140 mL    OUT:    Colostomy: 1300 mL  Total OUT: 1300 mL    Total NET: -1160 mL          Daily     Daily     LABS:                        12.8   13.16 )-----------( 249      ( 17 May 2018 07:52 )             38.0     05-17    139  |  104  |  28<H>  ----------------------------<  142<H>  4.6   |  20<L>  |  1.16    Ca    9.4      17 May 2018 04:43  Phos  2.9     05-17  Mg     2.1     05-17    TPro  7.7  /  Alb  3.8  /  TBili  0.4  /  DBili  x   /  AST  26  /  ALT  13  /  AlkPhos  134<H>  05-15    PT/INR - ( 17 May 2018 07:52 )   PT: 13.4 sec;   INR: 1.18 ratio         PTT - ( 17 May 2018 07:52 )  PTT:25.0 sec      A/P  73F POD #0 s/p transverse loop colostomy  -cont CLD  -DTV  -AM labs  -OOB/amb  -PT/OT eval  -ostomy teaching  -f/u hematology  -await path    MARLEY Peña PA-C , pager # [phone]

## 2018-05-17 NOTE — PHYSICAL THERAPY INITIAL EVALUATION ADULT - PERTINENT HX OF CURRENT PROBLEM, REHAB EVAL
Pt is a 74 y/o female admitted to [facility] on 5/15/18  presents with a chief complaint of abdominal distention. She initially presented to an OSH with complaint of SOB; CTPA was negative. CT Abd/Pelvis was obtained to evaluate for finding of abdominal tenderness and distention which revealed a rectosigmoid stricture. The patient reports that she began to feel distended 2 days ago, associated with decreased appetite. She felt mildly nauseated without vomiting. Currently denies pain. Passing flatus.

## 2018-05-17 NOTE — PHYSICAL THERAPY INITIAL EVALUATION ADULT - PLANNED THERAPY INTERVENTIONS, PT EVAL
transfer training/gait training/stair negotiation: GOAL: Pt will be able to negotiate 10 steps +HR independently with reciprocal pattern in 2 weeks./bed mobility training

## 2018-05-17 NOTE — PHYSICAL THERAPY INITIAL EVALUATION ADULT - PRECAUTIONS/LIMITATIONS, REHAB EVAL
flexible sigmoidoscopy: Stricture at 15 cm proximal to the anus and it could not be traversed. Pt is now s/p transverse colostomy 5/17

## 2018-05-17 NOTE — CONSULT NOTE ADULT - SUBJECTIVE AND OBJECTIVE BOX
74 yo  LMP  w/ a hx of peritoneal carcinoma s/p resection and chemoradiation  a/w with partial large bowel obstruction s/p loop colostomy, transverse colostomy and peritoneal biopsy (). Pt admitted with abdominal pain, distention n/v and findings concerning for LBO 2/2 to diverticular stricture vs mass. Pt reports no current gynecologic issues. Pt states she underwent treatment for peritoneal cancer in  at [facility]. Pt states she has been in remission. Pt follows regularly with a gynecologist in [place], Kush Khan. Per pt her last pap smear and mammogram were within normal limits. Pt denies vaginal bleeding, pelvic pain, dysuria back pain, fever, chills, weight loss, or loss of appetite. Denies chest pain, SOB, weakness or malaise.    OBHx: NSVDx2  GYNHx: Denies fibroids, cysts, abnl pap or STI  PMSHx:  Peritoneal carcinoma s/p resection and chemoradiation , PE , DM II  Meds: Coumadin  ALL: NKDA  Social: Former smoker  S: Patient doing well. Minimal lochia. Pain controlled.    O: Vital Signs Last 24 Hrs  T(C): 37.3 (17 May 2018 17:50), Max: 37.3 (17 May 2018 16:52)  T(F): 99.1 (17 May 2018 17:50), Max: 99.1 (17 May 2018 16:52)  HR: 79 (17 May 2018 18:58) (58 - 90)  BP: 154/67 (17 May 2018 18:58) (125/76 - 162/70)  BP(mean): 88 (17 May 2018 12:45) (86 - 98)  RR: 17 (17 May 2018 17:50) (15 - 20)  SpO2: 97% (17 May 2018 17:50) (94% - 100%)    Gen: NAD  Abd: soft, NT, ND, ostomy with liquid stool, port sites c/d/i  SVE: limited due to pt positioning and discomfort  Ext: no tenderness    Labs:                        12.8   13.16 )-----------( 249      ( 17 May 2018 07:52 )             38.0 74 yo  LMP  w/ a hx of peritoneal carcinoma s/p resection and chemoradiation  a/w with partial large bowel obstruction s/p loop colostomy, transverse colostomy and peritoneal biopsy ().  At the time of admission pt was noted to have abdominal pain, distention n/v and findings concerning for LBO 2/2 to diverticular stricture vs mass. 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     Pt reports no current gynecologic issues. Pt states she underwent treatment for peritoneal cancer in  at [facility]. Pt states she has been in remission. Pt follows regularly with a gynecologist in [place], Kush Khan. Per pt her last pap smear and mammogram were within normal limits. Pt denies vaginal bleeding, pelvic pain, dysuria back pain, fever, chills, weight loss, or loss of appetite. Denies chest pain, SOB, weakness or malaise.    OBHx: NSVDx2  GYNHx: Denies fibroids, cysts, abnl pap or STI  PMSHx:  Peritoneal carcinoma s/p resection and chemoradiation , PE , DM II  Meds: Coumadin  ALL: NKDA  Social: Former smoker  S: Patient doing well. Minimal lochia. Pain controlled.    O: Vital Signs Last 24 Hrs  T(C): 37.3 (17 May 2018 17:50), Max: 37.3 (17 May 2018 16:52)  T(F): 99.1 (17 May 2018 17:50), Max: 99.1 (17 May 2018 16:52)  HR: 79 (17 May 2018 18:58) (58 - 90)  BP: 154/67 (17 May 2018 18:58) (125/76 - 162/70)  BP(mean): 88 (17 May 2018 12:45) (86 - 98)  RR: 17 (17 May 2018 17:50) (15 - 20)  SpO2: 97% (17 May 2018 17:50) (94% - 100%)    Gen: NAD  Abd: soft, NT, ND, ostomy with liquid stool, port sites c/d/i  SVE: limited due to pt positioning and discomfort  Ext: no tenderness    Labs:                        12.8   13.16 )-----------( 249      ( 17 May 2018 07:52 )             38.0 72 yo  LMP  w/ a hx of anal carcinoma s/p resection and chemoradiation  a/w with partial large bowel obstruction s/p loop colostomy, transverse colostomy and peritoneal biopsy ().  At the time of admission pt was noted to have abdominal pain, distention n/v and findings concerning for LBO 2/2 to diverticular stricture vs mass. 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     Pt reports no current gynecologic issues. Pt states she underwent treatment for peroneal cancer in  at [facility]. Pt states she has been in remission. Pt follows regularly with a gynecologist in [place], Kush Khan. Per pt her last pap smear and mammogram were within normal limits. Pt denies vaginal bleeding, pelvic pain, dysuria back pain, fever, chills, weight loss, or loss of appetite. Denies chest pain, SOB, weakness or malaise.    OBHx: NSVDx2  GYNHx: Denies fibroids, cysts, abnl pap or STI  PMSHx:  Peroneal carcinoma s/p resection and chemoradiation , PE , DM II  Meds: Coumadin  ALL: NKDA  Social: Former smoker  S: Patient doing well. Minimal lochia. Pain controlled.    O: Vital Signs Last 24 Hrs  T(C): 37.3 (17 May 2018 17:50), Max: 37.3 (17 May 2018 16:52)  T(F): 99.1 (17 May 2018 17:50), Max: 99.1 (17 May 2018 16:52)  HR: 79 (17 May 2018 18:58) (58 - 90)  BP: 154/67 (17 May 2018 18:58) (125/76 - 162/70)  BP(mean): 88 (17 May 2018 12:45) (86 - 98)  RR: 17 (17 May 2018 17:50) (15 - 20)  SpO2: 97% (17 May 2018 17:50) (94% - 100%)    Gen: NAD  Abd: soft, NT, ND, ostomy with liquid stool, port sites c/d/i  SVE: limited due to pt positioning and discomfort  Ext: no tenderness    Labs:                        12.8   13.16 )-----------( 249      ( 17 May 2018 07:52 )             38.0 74 yo  LMP  w/ a hx of anal carcinoma s/p resection and chemoradiation  a/w with partial large bowel obstruction s/p loop colostomy, transverse colostomy and peritoneal biopsy ().  At the time of admission pt was noted to have abdominal pain, distention n/v and findings concerning for LBO 2/2 to diverticular stricture vs mass. 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     Pt reports no current gynecologic issues. Pt states she underwent treatment for peroneal cancer in  at [facility]. Pt states she has been in remission. Pt follows regularly with a gynecologist in [place], Kush Khan. Per pt her last pap smear and mammogram were within normal limits. Pt denies vaginal bleeding, pelvic pain, dysuria back pain, fever, chills, weight loss, or loss of appetite. Denies chest pain, SOB, weakness or malaise.    OBHx: NSVDx2  GYNHx: Denies fibroids, cysts, abnl pap or STI  PMSHx:  Peroneal carcinoma s/p resection and chemoradiation , PE , DM II  Meds: Coumadin  ALL: NKDA  Social: Former smoker  S: Patient doing well. Minimal lochia. Pain controlled.    O: Vital Signs Last 24 Hrs  T(C): 37.3 (17 May 2018 17:50), Max: 37.3 (17 May 2018 16:52)  T(F): 99.1 (17 May 2018 17:50), Max: 99.1 (17 May 2018 16:52)  HR: 79 (17 May 2018 18:58) (58 - 90)  BP: 154/67 (17 May 2018 18:58) (125/76 - 162/70)  BP(mean): 88 (17 May 2018 12:45) (86 - 98)  RR: 17 (17 May 2018 17:50) (15 - 20)  SpO2: 97% (17 May 2018 17:50) (94% - 100%)    Gen: NAD  Abd: soft, NT, ND, ostomy with liquid stool, port sites c/d/i  SVE: limited due to pt positioning and discomfort  Ext: no tenderness      Addendum (repeat PE on )  SSE: grossly normal cervix, smooth surface. bimanual: implants/nodularities palpated behind posterior fornix. unable to mobilize uterus well.   patient unable to tolerate rectal exam. no nodularities around sphincter. perineum grossly normal. no bleeding noted    Prasanna PGY 4  Labs:                        12.8   13.16 )-----------( 249      ( 17 May 2018 07:52 )             38.0 72 yo  LMP  w/ a hx of anal carcinoma s/p resection and chemoradiation  a/w with partial large bowel obstruction s/p loop colostomy, transverse colostomy and peritoneal biopsy ().  At the time of admission pt was noted to have abdominal pain, distention n/v and findings concerning for LBO 2/2 to diverticular stricture vs mass. 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     Pt reports no current gynecologic issues. Pt states she underwent treatment for peroneal cancer in  at [facility]. Pt states she has been in remission. Pt follows regularly with a gynecologist in [place], Kush Khan. Per pt her last pap smear and mammogram were within normal limits. Pt denies vaginal bleeding, pelvic pain, dysuria back pain, fever, chills, weight loss, or loss of appetite. Denies chest pain, SOB, weakness or malaise.    OBHx: NSVDx8  GYNHx: Denies fibroids, cysts, abnl pap or STI  PMSHx:  Peroneal carcinoma s/p resection and chemoradiation , PE , DM II  Meds: Coumadin  ALL: NKDA  Social: Former smoker  S: Patient doing well. Minimal lochia. Pain controlled.    O: Vital Signs Last 24 Hrs  T(C): 37.3 (17 May 2018 17:50), Max: 37.3 (17 May 2018 16:52)  T(F): 99.1 (17 May 2018 17:50), Max: 99.1 (17 May 2018 16:52)  HR: 79 (17 May 2018 18:58) (58 - 90)  BP: 154/67 (17 May 2018 18:58) (125/76 - 162/70)  BP(mean): 88 (17 May 2018 12:45) (86 - 98)  RR: 17 (17 May 2018 17:50) (15 - 20)  SpO2: 97% (17 May 2018 17:50) (94% - 100%)    Gen: NAD  Abd: soft, NT, ND, ostomy with liquid stool, port sites c/d/i  SVE: limited due to pt positioning and discomfort  Ext: no tenderness      Addendum (repeat PE on )  SSE: grossly normal cervix, smooth surface. bimanual: implants/nodularities palpated behind posterior fornix. unable to mobilize uterus well.   patient unable to tolerate rectal exam. no nodularities around sphincter. perineum grossly normal. no bleeding noted    Prasanna PGY 4  Labs:                        12.8   13.16 )-----------( 249      ( 17 May 2018 07:52 )             38.0 74 yo  LMP  w/ a hx of anal carcinoma s/p resection and chemoradiation  a/w with partial large bowel obstruction s/p loop colostomy, transverse colostomy and peritoneal biopsy ().  At the time of admission pt was noted to have abdominal pain, distention n/v and findings concerning for LBO 2/2 to diverticular stricture vs mass. 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     Pt reports no current gynecologic issues. Pt states she underwent treatment for peroneal cancer in  at [facility]. Pt states she has been in remission. Pt follows regularly with a gynecologist in [place], Kush Khan. Per pt her last pap smear and mammogram were within normal limits. Pt denies vaginal bleeding, pelvic pain, dysuria back pain, fever, chills, weight loss, or loss of appetite. Denies chest pain, SOB, weakness or malaise.    OBHx: NSVDx8  GYNHx: Denies fibroids, cysts, abnl pap or STI  PMSHx:  Peroneal carcinoma s/p resection and chemoradiation , PE , DM II  Meds: Coumadin  ALL: NKDA  Social: Former smoker  S: Patient doing well. Minimal lochia. Pain controlled.  Family history:  Family history of breast cancer (Mother)  Family history of brain cancer (Father)    O: Vital Signs Last 24 Hrs  T(C): 37.3 (17 May 2018 17:50), Max: 37.3 (17 May 2018 16:52)  T(F): 99.1 (17 May 2018 17:50), Max: 99.1 (17 May 2018 16:52)  HR: 79 (17 May 2018 18:58) (58 - 90)  BP: 154/67 (17 May 2018 18:58) (125/76 - 162/70)  BP(mean): 88 (17 May 2018 12:45) (86 - 98)  RR: 17 (17 May 2018 17:50) (15 - 20)  SpO2: 97% (17 May 2018 17:50) (94% - 100%)    Gen: NAD  Abd: soft, NT, ND, ostomy with liquid stool, port sites c/d/i  SVE: limited due to pt positioning and discomfort  Ext: no tenderness      Addendum (repeat PE on )  SSE: grossly normal cervix, smooth surface. bimanual: implants/nodularities palpated behind posterior fornix. unable to mobilize uterus well.   patient unable to tolerate rectal exam. no nodularities around sphincter. perineum grossly normal. no bleeding noted    Prasanna PGY 4  Labs:                        12.8   13.16 )-----------( 249      ( 17 May 2018 07:52 )             38.0

## 2018-05-17 NOTE — PROGRESS NOTE ADULT - SUBJECTIVE AND OBJECTIVE BOX
Red Team Surgery Progress Note     Subjective/24hour Events: Patient was seen and examined this morning. There was no acute event overnight. She is resting comfortably in bed. Pain is well controlled. Patient does not have flatus or bowel movement. She does not report pain, fever, n/v, chest pain, or shortness of breath. Will go to OR today    Vital Signs:  Vital Signs Last 24 Hrs  T(C): 36.8 (17 May 2018 04:22), Max: 37.1 (16 May 2018 09:19)  T(F): 98.3 (17 May 2018 04:22), Max: 98.8 (16 May 2018 09:19)  HR: 90 (17 May 2018 04:22) (75 - 90)  BP: 160/79 (17 May 2018 04:22) (149/74 - 166/72)  BP(mean): --  RR: 18 (17 May 2018 04:22) (18 - 18)  SpO2: 94% (17 May 2018 04:22) (92% - 97%)    CAPILLARY BLOOD GLUCOSE      POCT Blood Glucose.: 137 mg/dL (17 May 2018 05:56)  POCT Blood Glucose.: 121 mg/dL (17 May 2018 00:29)  POCT Blood Glucose.: 95 mg/dL (16 May 2018 18:37)  POCT Blood Glucose.: 98 mg/dL (16 May 2018 12:17)      I&O's Detail    16 May 2018 07:01  -  17 May 2018 07:00  --------------------------------------------------------  IN:    dextrose 5% + sodium chloride 0.45% with potassium chloride 20 mEq/L: 1200 mL  Total IN: 1200 mL    OUT:    Voided: 150 mL  Total OUT: 150 mL    Total NET: 1050 mL            MEDICATIONS  (STANDING):  dextrose 5% + sodium chloride 0.45% with potassium chloride 20 mEq/L 1000 milliLiter(s) (125 mL/Hr) IV Continuous <Continuous>  dextrose 5%. 1000 milliLiter(s) (50 mL/Hr) IV Continuous <Continuous>  dextrose 50% Injectable 12.5 Gram(s) IV Push once  insulin lispro (HumaLOG) corrective regimen sliding scale   SubCutaneous every 6 hours    MEDICATIONS  (PRN):  dextrose 40% Gel 15 Gram(s) Oral once PRN Blood Glucose LESS THAN 70 milliGRAM(s)/deciliter  glucagon  Injectable 1 milliGRAM(s) IntraMuscular once PRN Glucose LESS THAN 70 milligrams/deciliter        PHYSICAL EXAM:  Gen: NAD  Abdomen: Soft, obese, moderately distended, mildly tender, non-localized, no rebound, no guarding,      Labs:    05-17    139  |  104  |  28<H>  ----------------------------<  142<H>  4.6   |  20<L>  |  1.16    Ca    9.4      17 May 2018 04:43  Phos  2.9     05-17  Mg     2.1     05-17    TPro  7.7  /  Alb  3.8  /  TBili  0.4  /  DBili  x   /  AST  26  /  ALT  13  /  AlkPhos  134<H>  05-15    LIVER FUNCTIONS - ( 15 May 2018 20:13 )  Alb: 3.8 g/dL / Pro: 7.7 g/dL / ALK PHOS: 134 U/L / ALT: 13 U/L / AST: 26 U/L / GGT: x                                 11.5   6.37  )-----------( 225      ( 16 May 2018 07:54 )             35.6     PT/INR - ( 17 May 2018 00:37 )   PT: 15.2 sec;   INR: 1.39 ratio         PTT - ( 17 May 2018 00:37 )  PTT:24.1 sec      Ms. Gee is a 73 year old patient presented with partial LBO.   - OR this AM for lap loop colostomy possible SBR  - INR reversed with Vitamin K, Heparin drip when subtherapeutic tulio-operatively   - Diet: NPO  - Glycemic control  - Pain control  - Monitor GI function  - Replete electrolyte as necessary  - Activity: OOb as tolerated

## 2018-05-17 NOTE — CONSULT NOTE ADULT - ASSESSMENT
72 yo  LMP  w/ a hx of peritoneal carcinoma s/p resection and chemoradiation  a/w with partial large bowel obstruction s/p loop colostomy, transverse colostomy and peritoneal biopsy ().    Suspicion for malignancy high given pt history of peritoneal ca and implants seen within peritoneum today. Will need biopsy results to determine GI vs gynecologic origin.   -Tumor markers including  should be obtained.   -Obtain pt's medical records from [place]  -Will continue to follow along while pt is in house.   -Will consult gyn oncologist if biopsy shows mullerian origin.     D/W Dr. Mendelberger R. Gordon, PGY3 72 yo  LMP  w/ a hx of peritoneal carcinoma s/p resection and chemoradiation  a/w with partial large bowel obstruction s/p loop colostomy, transverse colostomy and peritoneal biopsy ().    Suspicion for malignancy high given pt history of peritoneal ca and the ascites and peritoneal implants seen within peritoneum today. Will need biopsy results to determine GI vs gynecologic origin.   -Tumor markers including  should be obtained.   -Obtain pt's medical records from [place]  -Will continue to follow along while pt is in house.   -Will consult gyn oncology for recommendations    D/W Dr. Stephen Roblero, PGY3 74 yo  LMP  w/ a hx of peroneal carcinoma s/p resection and chemoradiation  a/w with partial large bowel obstruction s/p loop colostomy, transverse colostomy and peritoneal biopsy ().    Suspicion for malignancy high given the ascites and peritoneal implants seen within peritoneum today. Will need biopsy results to determine GI vs gynecologic origin.   -Tumor markers including  should be obtained.   -Obtain pt's medical records from [place]  -Will continue to follow along while pt is in house.     D/W Dr. Stephen Roblero, PGY3

## 2018-05-17 NOTE — CONSULT NOTE ADULT - ATTENDING COMMENTS
As above.    Impression:    #1.  Partial large bowel obstruction at level of rectosigmoid junction in the region of the uterus, with colo-uterine fistulas.  Patient states history of cancer treated with a procedure and chemotherapy (uterine/endometrial vs. less likely colorectal).    #2.  History of PE on anticoagulation with INR 4, s/p Vitamin K IV    Recommendations:    #1.  Flexible sigmoidoscopy with enema prep today (5/16/18) after INR improved for diagnostic purposes.  Will not attempt stent today, as nature of obstruction (benign vs. malignant) is not clear.
72yo with hx anal CA admitted with LBO and possible pelvic mass. Intraop findings included peritoneal studding, normal appearing R ovary with 1cm simple appearing cyst. Fallopian tubes inflamed with studding/implants. Uterus not fully visualized but left/posterior uterus visualized and wnl. Left adnexae not fully visualized. possibly involved in firm mass along left pelvic sidewall that involves colon. Biopsies taken and fluid sent for cytology. Will await results. S/p flex sig with biopsy nonmalignant on 5/16. Please obtain ,  and CEA tumor markers.   SHANNEN Mitchell
Patient with a history of PE in 2007, on coumadin since that time and with son who has factor V leiden mutation who presents with abdominal distension, and found to have a malignant SBO.  Regarding tulio-operative management, can give vitamin K to reverse INR and once <2, can start on heparin gtt until surgical intervention is planned.  At this time, would not recommend hypercoagulable work up as this does not change her management.  Specifically it appears that she likely has a malignancy and with this hypercoagulable state, she would benefit from continued anticoagulation.

## 2018-05-17 NOTE — BRIEF OPERATIVE NOTE - PROCEDURE
<<-----Click on this checkbox to enter Procedure Loop colostomy  05/17/2018  transverse colostomy  Active  [id number]

## 2018-05-18 LAB
ANION GAP SERPL CALC-SCNC: 10 MMOL/L — SIGNIFICANT CHANGE UP (ref 5–17)
APTT BLD: 165.9 SEC — CRITICAL HIGH (ref 27.5–37.4)
BUN SERPL-MCNC: 22 MG/DL — SIGNIFICANT CHANGE UP (ref 7–23)
CALCIUM SERPL-MCNC: 8.8 MG/DL — SIGNIFICANT CHANGE UP (ref 8.4–10.5)
CANCER AG125 SERPL-ACNC: 137 U/ML — HIGH
CANCER AG19-9 SERPL-ACNC: <1 U/ML — SIGNIFICANT CHANGE UP
CHLORIDE SERPL-SCNC: 106 MMOL/L — SIGNIFICANT CHANGE UP (ref 96–108)
CO2 SERPL-SCNC: 24 MMOL/L — SIGNIFICANT CHANGE UP (ref 22–31)
CREAT SERPL-MCNC: 1.2 MG/DL — SIGNIFICANT CHANGE UP (ref 0.5–1.3)
GLUCOSE BLDC GLUCOMTR-MCNC: 74 MG/DL — SIGNIFICANT CHANGE UP (ref 70–99)
GLUCOSE BLDC GLUCOMTR-MCNC: 77 MG/DL — SIGNIFICANT CHANGE UP (ref 70–99)
GLUCOSE BLDC GLUCOMTR-MCNC: 88 MG/DL — SIGNIFICANT CHANGE UP (ref 70–99)
GLUCOSE BLDC GLUCOMTR-MCNC: 94 MG/DL — SIGNIFICANT CHANGE UP (ref 70–99)
GLUCOSE SERPL-MCNC: 82 MG/DL — SIGNIFICANT CHANGE UP (ref 70–99)
HCT VFR BLD CALC: 36.1 % — SIGNIFICANT CHANGE UP (ref 34.5–45)
HGB BLD-MCNC: 12 G/DL — SIGNIFICANT CHANGE UP (ref 11.5–15.5)
INR BLD: 1.27 RATIO — HIGH (ref 0.88–1.16)
MAGNESIUM SERPL-MCNC: 2.1 MG/DL — SIGNIFICANT CHANGE UP (ref 1.6–2.6)
MCHC RBC-ENTMCNC: 28.4 PG — SIGNIFICANT CHANGE UP (ref 27–34)
MCHC RBC-ENTMCNC: 33.2 GM/DL — SIGNIFICANT CHANGE UP (ref 32–36)
MCV RBC AUTO: 85.3 FL — SIGNIFICANT CHANGE UP (ref 80–100)
PHOSPHATE SERPL-MCNC: 2.4 MG/DL — LOW (ref 2.5–4.5)
PLATELET # BLD AUTO: 213 K/UL — SIGNIFICANT CHANGE UP (ref 150–400)
POTASSIUM SERPL-MCNC: 3.8 MMOL/L — SIGNIFICANT CHANGE UP (ref 3.5–5.3)
POTASSIUM SERPL-SCNC: 3.8 MMOL/L — SIGNIFICANT CHANGE UP (ref 3.5–5.3)
PROTHROM AB SERPL-ACNC: 13.9 SEC — HIGH (ref 9.8–12.7)
RBC # BLD: 4.23 M/UL — SIGNIFICANT CHANGE UP (ref 3.8–5.2)
RBC # FLD: 15.3 % — HIGH (ref 10.3–14.5)
SODIUM SERPL-SCNC: 140 MMOL/L — SIGNIFICANT CHANGE UP (ref 135–145)
WBC # BLD: 7.95 K/UL — SIGNIFICANT CHANGE UP (ref 3.8–10.5)
WBC # FLD AUTO: 7.95 K/UL — SIGNIFICANT CHANGE UP (ref 3.8–10.5)

## 2018-05-18 PROCEDURE — 99222 1ST HOSP IP/OBS MODERATE 55: CPT | Mod: GC

## 2018-05-18 RX ORDER — INSULIN LISPRO 100/ML
VIAL (ML) SUBCUTANEOUS
Qty: 0 | Refills: 0 | Status: DISCONTINUED | OUTPATIENT
Start: 2018-05-18 | End: 2018-05-21

## 2018-05-18 RX ORDER — BENZOCAINE AND MENTHOL 5; 1 G/100ML; G/100ML
1 LIQUID ORAL EVERY 4 HOURS
Qty: 0 | Refills: 0 | Status: DISCONTINUED | OUTPATIENT
Start: 2018-05-18 | End: 2018-05-21

## 2018-05-18 RX ORDER — ENOXAPARIN SODIUM 100 MG/ML
90 INJECTION SUBCUTANEOUS EVERY 12 HOURS
Qty: 0 | Refills: 0 | Status: DISCONTINUED | OUTPATIENT
Start: 2018-05-18 | End: 2018-05-21

## 2018-05-18 RX ORDER — DEXTROSE MONOHYDRATE, SODIUM CHLORIDE, AND POTASSIUM CHLORIDE 50; .745; 4.5 G/1000ML; G/1000ML; G/1000ML
1000 INJECTION, SOLUTION INTRAVENOUS
Qty: 0 | Refills: 0 | Status: DISCONTINUED | OUTPATIENT
Start: 2018-05-18 | End: 2018-05-19

## 2018-05-18 RX ORDER — POTASSIUM PHOSPHATE, MONOBASIC POTASSIUM PHOSPHATE, DIBASIC 236; 224 MG/ML; MG/ML
15 INJECTION, SOLUTION INTRAVENOUS ONCE
Qty: 0 | Refills: 0 | Status: COMPLETED | OUTPATIENT
Start: 2018-05-18 | End: 2018-05-18

## 2018-05-18 RX ORDER — WARFARIN SODIUM 2.5 MG/1
2.5 TABLET ORAL ONCE
Qty: 0 | Refills: 0 | Status: COMPLETED | OUTPATIENT
Start: 2018-05-18 | End: 2018-05-18

## 2018-05-18 RX ADMIN — SODIUM CHLORIDE 125 MILLILITER(S): 9 INJECTION, SOLUTION INTRAVENOUS at 12:34

## 2018-05-18 RX ADMIN — POTASSIUM PHOSPHATE, MONOBASIC POTASSIUM PHOSPHATE, DIBASIC 62.5 MILLIMOLE(S): 236; 224 INJECTION, SOLUTION INTRAVENOUS at 12:34

## 2018-05-18 RX ADMIN — WARFARIN SODIUM 2.5 MILLIGRAM(S): 2.5 TABLET ORAL at 23:21

## 2018-05-18 RX ADMIN — Medication 1000 MILLIGRAM(S): at 12:16

## 2018-05-18 RX ADMIN — HEPARIN SODIUM 14 UNIT(S)/HR: 5000 INJECTION INTRAVENOUS; SUBCUTANEOUS at 01:07

## 2018-05-18 RX ADMIN — ENOXAPARIN SODIUM 90 MILLIGRAM(S): 100 INJECTION SUBCUTANEOUS at 18:49

## 2018-05-18 RX ADMIN — DEXTROSE MONOHYDRATE, SODIUM CHLORIDE, AND POTASSIUM CHLORIDE 100 MILLILITER(S): 50; .745; 4.5 INJECTION, SOLUTION INTRAVENOUS at 14:10

## 2018-05-18 RX ADMIN — Medication 400 MILLIGRAM(S): at 11:04

## 2018-05-18 RX ADMIN — BENZOCAINE AND MENTHOL 1 LOZENGE: 5; 1 LIQUID ORAL at 23:34

## 2018-05-18 NOTE — OCCUPATIONAL THERAPY INITIAL EVALUATION ADULT - ADL RETRAINING, OT EVAL
GOALS: Pt will perform UB/LB dressing independently within 2 weeks.  Pt will perform toileting independently within 2 weeks.

## 2018-05-18 NOTE — OCCUPATIONAL THERAPY INITIAL EVALUATION ADULT - ADDITIONAL COMMENTS
Pt received in bedside chair, + PIV  Pt is a 74 y/o female admitted to [facility] on 5/15/18  presents with a chief complaint of abdominal distention. She initially presented to an OSH with complaint of SOB; CTPA was negative. CT Abd/Pelvis was obtained to evaluate for finding of abdominal tenderness and distention which revealed a rectosigmoid stricture. The patient reports that she began to feel distended 2 days ago, associated with decreased appetite. She felt mildly nauseated without vomiting. Currently denies pain. Passing flatus.  flexible sigmoidoscopy: Stricture at 15 cm proximal to the anus and it could not be traversed. Pt is now s/p transverse colostomy 5/17 Pt is a 72 y/o female admitted to [facility] on 5/15/18  presents with a chief complaint of abdominal distention. She initially presented to an OSH with complaint of SOB; CTPA was negative. CT Abd/Pelvis was obtained to evaluate for finding of abdominal tenderness and distention which revealed a rectosigmoid stricture. The patient reports that she began to feel distended 2 days ago, associated with decreased appetite. She felt mildly nauseated without vomiting. Currently denies pain. Passing flatus.  flexible sigmoidoscopy: Stricture at 15 cm proximal to the anus and it could not be traversed. Pt is now s/p transverse colostomy 5/17

## 2018-05-18 NOTE — PROGRESS NOTE ADULT - SUBJECTIVE AND OBJECTIVE BOX
Red Team Surgery Progress Note     Subjective/24hour Events: Heparin gtt started after OR. aPTT elevated to 166, decreased infusion rate, will follow up morning pTT.     Vital Signs:  Vital Signs Last 24 Hrs  T(C): 36.9 (18 May 2018 01:01), Max: 37.3 (17 May 2018 16:52)  T(F): 98.5 (18 May 2018 01:01), Max: 99.1 (17 May 2018 16:52)  HR: 67 (18 May 2018 01:01) (58 - 86)  BP: 152/76 (18 May 2018 01:01) (125/64 - 162/70)  BP(mean): 88 (17 May 2018 12:45) (86 - 98)  RR: 18 (18 May 2018 01:01) (15 - 20)  SpO2: 97% (18 May 2018 01:01) (94% - 100%)    CAPILLARY BLOOD GLUCOSE      POCT Blood Glucose.: 93 mg/dL (17 May 2018 22:10)  POCT Blood Glucose.: 74 mg/dL (17 May 2018 18:32)  POCT Blood Glucose.: 101 mg/dL (17 May 2018 11:25)  POCT Blood Glucose.: 137 mg/dL (17 May 2018 05:56)      I&O's Detail    16 May 2018 07:01  -  17 May 2018 07:00  --------------------------------------------------------  IN:    dextrose 5% + sodium chloride 0.45% with potassium chloride 20 mEq/L: 1200 mL  Total IN: 1200 mL    OUT:    Voided: 150 mL  Total OUT: 150 mL    Total NET: 1050 mL      17 May 2018 07:01  -  18 May 2018 05:45  --------------------------------------------------------  IN:    heparin Infusion: 68 mL    IV PiggyBack: 100 mL    lactated ringers.: 1040 mL    Oral Fluid: 220 mL  Total IN: 1428 mL    OUT:    Colostomy: 2050 mL    Voided: 550 mL  Total OUT: 2600 mL    Total NET: -1172 mL            MEDICATIONS  (STANDING):  acetaminophen  IVPB. 1000 milliGRAM(s) IV Intermittent once  dextrose 5%. 1000 milliLiter(s) (50 mL/Hr) IV Continuous <Continuous>  dextrose 50% Injectable 12.5 Gram(s) IV Push once  dextrose 50% Injectable 25 Gram(s) IV Push once  dextrose 50% Injectable 25 Gram(s) IV Push once  heparin  Infusion 1400 Unit(s)/Hr (14 mL/Hr) IV Continuous <Continuous>  insulin lispro (HumaLOG) corrective regimen sliding scale   SubCutaneous three times a day before meals  insulin lispro (HumaLOG) corrective regimen sliding scale   SubCutaneous at bedtime  lactated ringers. 1000 milliLiter(s) (125 mL/Hr) IV Continuous <Continuous>    MEDICATIONS  (PRN):  dextrose 40% Gel 15 Gram(s) Oral once PRN Blood Glucose LESS THAN 70 milliGRAM(s)/deciliter  glucagon  Injectable 1 milliGRAM(s) IntraMuscular once PRN Glucose LESS THAN 70 milligrams/deciliter        Physical Exam:  Gen: NAD  LS:  Card:  GI:  Ext:       Labs:    05-17    139  |  104  |  28<H>  ----------------------------<  142<H>  4.6   |  20<L>  |  1.16    Ca    9.4      17 May 2018 04:43  Phos  2.9     05-17  Mg     2.1     05-17                              12.8   13.16 )-----------( 249      ( 17 May 2018 07:52 )             38.0     PT/INR - ( 17 May 2018 07:52 )   PT: 13.4 sec;   INR: 1.18 ratio         PTT - ( 17 May 2018 22:57 )  PTT:165.9 sec          Imaging: Red Team Surgery Progress Note     Subjective/24hour Events: Heparin gtt started after OR. aPTT elevated to 166, decreased infusion rate, will follow up morning pTT.     Vital Signs:  Vital Signs Last 24 Hrs  T(C): 36.9 (18 May 2018 01:01), Max: 37.3 (17 May 2018 16:52)  T(F): 98.5 (18 May 2018 01:01), Max: 99.1 (17 May 2018 16:52)  HR: 67 (18 May 2018 01:01) (58 - 86)  BP: 152/76 (18 May 2018 01:01) (125/64 - 162/70)  BP(mean): 88 (17 May 2018 12:45) (86 - 98)  RR: 18 (18 May 2018 01:01) (15 - 20)  SpO2: 97% (18 May 2018 01:01) (94% - 100%)    CAPILLARY BLOOD GLUCOSE      POCT Blood Glucose.: 93 mg/dL (17 May 2018 22:10)  POCT Blood Glucose.: 74 mg/dL (17 May 2018 18:32)  POCT Blood Glucose.: 101 mg/dL (17 May 2018 11:25)  POCT Blood Glucose.: 137 mg/dL (17 May 2018 05:56)      I&O's Detail    16 May 2018 07:01  -  17 May 2018 07:00  --------------------------------------------------------  IN:    dextrose 5% + sodium chloride 0.45% with potassium chloride 20 mEq/L: 1200 mL  Total IN: 1200 mL    OUT:    Voided: 150 mL  Total OUT: 150 mL    Total NET: 1050 mL      17 May 2018 07:01  -  18 May 2018 05:45  --------------------------------------------------------  IN:    heparin Infusion: 68 mL    IV PiggyBack: 100 mL    lactated ringers.: 1040 mL    Oral Fluid: 220 mL  Total IN: 1428 mL    OUT:    Colostomy: 2050 mL    Voided: 550 mL  Total OUT: 2600 mL    Total NET: -1172 mL            MEDICATIONS  (STANDING):  acetaminophen  IVPB. 1000 milliGRAM(s) IV Intermittent once  dextrose 5%. 1000 milliLiter(s) (50 mL/Hr) IV Continuous <Continuous>  dextrose 50% Injectable 12.5 Gram(s) IV Push once  dextrose 50% Injectable 25 Gram(s) IV Push once  dextrose 50% Injectable 25 Gram(s) IV Push once  heparin  Infusion 1400 Unit(s)/Hr (14 mL/Hr) IV Continuous <Continuous>  insulin lispro (HumaLOG) corrective regimen sliding scale   SubCutaneous three times a day before meals  insulin lispro (HumaLOG) corrective regimen sliding scale   SubCutaneous at bedtime  lactated ringers. 1000 milliLiter(s) (125 mL/Hr) IV Continuous <Continuous>    MEDICATIONS  (PRN):  dextrose 40% Gel 15 Gram(s) Oral once PRN Blood Glucose LESS THAN 70 milliGRAM(s)/deciliter  glucagon  Injectable 1 milliGRAM(s) IntraMuscular once PRN Glucose LESS THAN 70 milligrams/deciliter        Physical Exam:  NAD  abd softly distended, NT  port sites C/D/I  colostomy with gas and liquid stool      Labs:    05-17    139  |  104  |  28<H>  ----------------------------<  142<H>  4.6   |  20<L>  |  1.16    Ca    9.4      17 May 2018 04:43  Phos  2.9     05-17  Mg     2.1     05-17                              12.8   13.16 )-----------( 249      ( 17 May 2018 07:52 )             38.0     PT/INR - ( 17 May 2018 07:52 )   PT: 13.4 sec;   INR: 1.18 ratio         PTT - ( 17 May 2018 22:57 )  PTT:165.9 sec          Imaging: Red Team Surgery Progress Note     Subjective/24hour Events: Heparin gtt started after OR. aPTT elevated to 166, decreased infusion rate, will follow up morning pTT.     Vital Signs:  Vital Signs Last 24 Hrs  T(C): 36.9 (18 May 2018 01:01), Max: 37.3 (17 May 2018 16:52)  T(F): 98.5 (18 May 2018 01:01), Max: 99.1 (17 May 2018 16:52)  HR: 67 (18 May 2018 01:01) (58 - 86)  BP: 152/76 (18 May 2018 01:01) (125/64 - 162/70)  BP(mean): 88 (17 May 2018 12:45) (86 - 98)  RR: 18 (18 May 2018 01:01) (15 - 20)  SpO2: 97% (18 May 2018 01:01) (94% - 100%)    CAPILLARY BLOOD GLUCOSE      POCT Blood Glucose.: 93 mg/dL (17 May 2018 22:10)  POCT Blood Glucose.: 74 mg/dL (17 May 2018 18:32)  POCT Blood Glucose.: 101 mg/dL (17 May 2018 11:25)  POCT Blood Glucose.: 137 mg/dL (17 May 2018 05:56)      I&O's Detail    16 May 2018 07:01  -  17 May 2018 07:00  --------------------------------------------------------  IN:    dextrose 5% + sodium chloride 0.45% with potassium chloride 20 mEq/L: 1200 mL  Total IN: 1200 mL    OUT:    Voided: 150 mL  Total OUT: 150 mL    Total NET: 1050 mL      17 May 2018 07:01  -  18 May 2018 05:45  --------------------------------------------------------  IN:    heparin Infusion: 68 mL    IV PiggyBack: 100 mL    lactated ringers.: 1040 mL    Oral Fluid: 220 mL  Total IN: 1428 mL    OUT:    Colostomy: 2050 mL    Voided: 550 mL  Total OUT: 2600 mL    Total NET: -1172 mL            MEDICATIONS  (STANDING):  acetaminophen  IVPB. 1000 milliGRAM(s) IV Intermittent once  dextrose 5%. 1000 milliLiter(s) (50 mL/Hr) IV Continuous <Continuous>  dextrose 50% Injectable 12.5 Gram(s) IV Push once  dextrose 50% Injectable 25 Gram(s) IV Push once  dextrose 50% Injectable 25 Gram(s) IV Push once  heparin  Infusion 1400 Unit(s)/Hr (14 mL/Hr) IV Continuous <Continuous>  insulin lispro (HumaLOG) corrective regimen sliding scale   SubCutaneous three times a day before meals  insulin lispro (HumaLOG) corrective regimen sliding scale   SubCutaneous at bedtime  lactated ringers. 1000 milliLiter(s) (125 mL/Hr) IV Continuous <Continuous>    MEDICATIONS  (PRN):  dextrose 40% Gel 15 Gram(s) Oral once PRN Blood Glucose LESS THAN 70 milliGRAM(s)/deciliter  glucagon  Injectable 1 milliGRAM(s) IntraMuscular once PRN Glucose LESS THAN 70 milligrams/deciliter        Physical Exam:  NAD  abd softly distended, NT  port sites C/D/I  colostomy with gas and liquid stool      Labs:    05-17    139  |  104  |  28<H>  ----------------------------<  142<H>  4.6   |  20<L>  |  1.16    Ca    9.4      17 May 2018 04:43  Phos  2.9     05-17  Mg     2.1     05-17                              12.8   13.16 )-----------( 249      ( 17 May 2018 07:52 )             38.0     PT/INR - ( 17 May 2018 07:52 )   PT: 13.4 sec;   INR: 1.18 ratio         PTT - ( 17 May 2018 22:57 )  PTT:165.9 sec        A/P  73F POD #1 s/p transverse loop colostomy  - CLD, possible advance to LRD today  - hep gtt per heme, need to hol and follow-up pTT  -AM labs  -OOB/amb  -PT/OT eval  -ostomy teaching  -f/u EGD today  -await path

## 2018-05-18 NOTE — PROGRESS NOTE ADULT - SUBJECTIVE AND OBJECTIVE BOX
ADVANCED ENDOSCOPY PROGRESS NOTE      Chief Complaint:  Patient is a 73y old  Female who presents with a chief complaint of abdominal distention (16 May 2018 05:52)      Interval Events: Patient s/p ex-lap and loop colostomy yesterday. Feels ok. Some minor pain at surgical site. Per surgical team, patient had coffee ground emesis yesterday that patient herself is not aware of.    Allergies:  No Known Allergies      Hospital Medications:  dextrose 40% Gel 15 Gram(s) Oral once PRN  dextrose 5% + sodium chloride 0.45% with potassium chloride 20 mEq/L 1000 milliLiter(s) IV Continuous <Continuous>  dextrose 5%. 1000 milliLiter(s) IV Continuous <Continuous>  dextrose 50% Injectable 12.5 Gram(s) IV Push once  dextrose 50% Injectable 25 Gram(s) IV Push once  dextrose 50% Injectable 25 Gram(s) IV Push once  enoxaparin Injectable 90 milliGRAM(s) SubCutaneous every 12 hours  glucagon  Injectable 1 milliGRAM(s) IntraMuscular once PRN  insulin lispro (HumaLOG) corrective regimen sliding scale   SubCutaneous Before meals and at bedtime  warfarin 2.5 milliGRAM(s) Oral once      PMHX/PSHX:  Injury of right brachial plexus, sequela  Pulmonary embolism  DM (diabetes mellitus)  Diabetes 1.5, managed as type 2  History of genitourinary surgery  No significant past surgical history      Family history:  Family history of breast cancer (Mother)  Family history of brain cancer (Father)  No pertinent family history in first degree relatives      ROS:     General:  No wt loss, fevers, chills, night sweats, fatigue,   Eyes:  Good vision, no reported pain  ENT:  No sore throat, pain, runny nose, dysphagia  CV:  No pain, palpitations, hypo/hypertension  Resp:  No dyspnea, cough, tachypnea, wheezing  GI:  See HPI  :  No pain, bleeding, incontinence, nocturia  Muscle:  No pain, weakness  Neuro:  No weakness, tingling, memory problems  Skin:  No rash, edema      PHYSICAL EXAM:   Vital Signs:  Vital Signs Last 24 Hrs  T(C): 37 (18 May 2018 14:21), Max: 37.3 (17 May 2018 16:52)  T(F): 98.6 (18 May 2018 14:21), Max: 99.1 (17 May 2018 16:52)  HR: 64 (18 May 2018 14:21) (63 - 79)  BP: 124/53 (18 May 2018 14:21) (124/53 - 154/67)  BP(mean): --  RR: 18 (18 May 2018 14:21) (17 - 19)  SpO2: 95% (18 May 2018 14:21) (95% - 99%)  Daily     Daily     GENERAL:  Appears stated age, well-groomed, well-nourished, no distress  HEENT:  NC/AT,  conjunctivae clear, sclera -anicteric  CHEST:  Full & symmetric excursion, no increased effort, breath sounds clear  HEART:  Regular rhythm, S1, S2, no murmur  ABDOMEN:  Soft, non-tender, non-distended, colostomy bag with some semi-solid brown stool  EXTREMITIES:  no cyanosis,clubbing or edema  SKIN:  No rash/erythema  NEURO:  Alert, oriented,     LABS:                        12.0   7.95  )-----------( 213      ( 18 May 2018 09:58 )             36.1     Hemoglobin: 12.0 g/dL (05-18-18 @ 09:58)  Hemoglobin: 12.8 g/dL (05-17-18 @ 07:52)  Hemoglobin: 11.5 g/dL (05-16-18 @ 07:54)  Hemoglobin: 13.4 g/dL (05-15-18 @ 20:13)  Hemoglobin: 13.2 g/dL (05-15-18 @ 05:19)      05-18    140  |  106  |  22  ----------------------------<  82  3.8   |  24  |  1.20    Ca    8.8      18 May 2018 07:49  Phos  2.4     05-18  Mg     2.1     05-18        PT/INR - ( 18 May 2018 07:55 )   PT: 13.9 sec;   INR: 1.27 ratio         PTT - ( 18 May 2018 07:55 )  PTT:165.9 sec

## 2018-05-18 NOTE — PROGRESS NOTE ADULT - ATTENDING COMMENTS
pt feels much better, colostomy with >2liters output since OR    Pt was able to find some of her records that indicate that she had anal squamous cell CA that was treated partly at [facility] and partly at [facility].     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 need for anticoagulation.  cont hep gtt for now until seen by amando.   case management to assess for VNS vs rehab as pt has limited mobility of her right hand and will need assistance with colostomy care

## 2018-05-18 NOTE — CHART NOTE - NSCHARTNOTEFT_GEN_A_CORE
Gyn R4    Patient seen at bedside. Further history obtained from patient and repeat PE done.     Addendum (repeat PE on 5/18)  SSE: grossly normal cervix, smooth surface. bimanual: implants/nodularities palpated behind posterior fornix. unable to mobilize uterus well.   patient unable to tolerate rectal exam. no nodularities around sphincter. perineum grossly normal. no bleeding noted    Able to get some records from [facility] 2007 when patient diagnosed with multiple PE bilaterally.   Patients oncologist: Dr. Felecia Rowe-- records show she had anal carcinoma- squamous type. Patient given mitomycin, 5-FU and concomitant radiation by Dr. Aparicio.     As per conversation with patients PCP. Patient placed on lifelong anticoagulation due to unprovoked PE. States thrombophilia work up negative. Was unaware of patients cancer history    Will continue to follow  Plan to discuss with Dr. Eliane Mims PGY 4 Gyn R4    Patient seen at bedside. Further history obtained from patient and repeat PE done.     Addendum (repeat PE on 5/18)  SSE: grossly normal cervix, smooth surface. bimanual: implants/nodularities palpated behind posterior fornix. unable to mobilize uterus well.   patient unable to tolerate rectal exam. no nodularities around sphincter. perineum grossly normal. no bleeding noted    Able to get some records from [facility] 2007 when patient diagnosed with multiple PE bilaterally.   Patients oncologist: Dr. Felecia Rowe-- records show she had anal carcinoma- squamous type. Patient given mitomycin, 5-FU and concomitant radiation by Dr. Aparicio.     As per conversation with patients PCP. Patient placed on lifelong anticoagulation due to unprovoked PE. States thrombophilia work up negative. Was unaware of patients cancer history    Patients phone number: [phone]  Neighbor (Sana Reza): [phone]    Will continue to follow  Plan to discuss with Dr. Eliane Mims PGY 4 Gyn R4    Patient seen at bedside. Further history obtained from patient and repeat PE done.     Addendum (repeat PE on 5/18)  SSE: grossly normal cervix, smooth surface. bimanual: implants/nodularities palpated behind posterior fornix. unable to mobilize uterus well.   patient unable to tolerate rectal exam. no nodularities around sphincter. perineum grossly normal. no bleeding noted    Able to get some records from [facility] 2007 when patient diagnosed with multiple PE bilaterally.   Patients oncologist: Dr. Felecia Rowe-- records show she had anal carcinoma- squamous type. Patient given mitomycin, 5-FU and concomitant radiation by Dr. Aparicio.     As per conversation with patients PCP. Patient placed on lifelong anticoagulation due to unprovoked PE. States thrombophilia work up negative. Was unaware of patients cancer history    Patients phone number: [phone]  Neighbor (Sana Reza): [phone]    Plan to follow up peritoneal biopsies  Plan to obtain GYN records (patient states she possibly had pap smear in 10/2017, and sonogram)    Will continue to follow  Plan to discuss with Dr. Eliane Mims PGY 4 Gyn R4    Patient seen at bedside. Further history obtained from patient and repeat PE done.     Addendum (repeat PE on 5/18)  SSE: grossly normal cervix, smooth surface. bimanual: implants/nodularities palpated behind posterior fornix. unable to mobilize uterus well.   patient unable to tolerate rectal exam. no nodularities around sphincter. perineum grossly normal. no bleeding noted    Able to get some records from [facility] 2007 when patient diagnosed with multiple PE bilaterally.   Patients oncologist: Dr. Felecia Rowe-- records show she had anal carcinoma- squamous type. Patient given mitomycin, 5-FU and concomitant radiation by Dr. Aparicio.     As per conversation with patients PCP. Patient placed on lifelong anticoagulation due to unprovoked PE. States thrombophilia work up negative. Was unaware of patients cancer history    Patients phone number: [phone]  Neighbor (Sana Reza & health care proxy): [phone]    Plan to follow up peritoneal biopsies  Plan to obtain GYN records (patient states she possibly had pap smear in 10/2017, and sonogram)    Will continue to follow  Plan to discuss with Dr. Eliane Mims PGY 4    Gyn/Oncology Attending Addendum    Patient seen & examined with PGY4, imaging/labs and historical records were reviewed, and agree with above as noted. Gyn R4    Patient seen at bedside. Further history obtained from patient and repeat PE done.     Addendum (repeat PE on 5/18)  SSE: grossly normal cervix, smooth surface. bimanual: implants/nodularities palpated behind posterior fornix. unable to mobilize uterus well.   patient unable to tolerate rectal exam. no nodularities around sphincter. perineum grossly normal. no bleeding noted    Able to get some records from [facility] 2007 when patient diagnosed with multiple PE bilaterally.   Patients oncologist: Dr. Felecia Rowe-- records show she had anal carcinoma- squamous type. Patient given mitomycin, 5-FU and concomitant radiation by Dr. Aparicio.     As per conversation with patients PCP. Patient placed on lifelong anticoagulation due to unprovoked PE. States thrombophilia work up negative. Was unaware of patients cancer history    Patients phone number: [phone]  Neighbor (Sana Reza & health care proxy): [phone]    Plan to follow up peritoneal biopsies  Plan to obtain GYN records (patient states she possibly had pap smear in 10/2017, and sonogram)    Will continue to follow  Plan to discuss with Dr. Eliane Mims PGY 4    Gyn/Oncology Attending Addendum    Patient seen & examined with PGY4, imaging/labs and historical records were reviewed, and agree with above as noted.  Patient's health care proxy was present at bedside at the time of the H & P.  Briefly, Ms. Gee is a 73 year old P7 with past medical history of PE, DM & anal cancer status post chemoradiation admitted with large bowel obstruction now status post Gyn R4    Patient seen at bedside. Further history obtained from patient and repeat PE done.     Addendum (repeat PE on 5/18)  SSE: grossly normal cervix, smooth surface. bimanual: implants/nodularities palpated behind posterior fornix. unable to mobilize uterus well.   patient unable to tolerate rectal exam. no nodularities around sphincter. perineum grossly normal. no bleeding noted    Able to get some records from [facility] 2007 when patient diagnosed with multiple PE bilaterally.   Patients oncologist: Dr. Felecia Rowe-- records show she had anal carcinoma- squamous type. Patient given mitomycin, 5-FU and concomitant radiation by Dr. Aparicio.     As per conversation with patients PCP. Patient placed on lifelong anticoagulation due to unprovoked PE. States thrombophilia work up negative. Was unaware of patients cancer history    Patients phone number: [phone]  Neighbor (Sana Reza & health care proxy): [phone]    Plan to follow up peritoneal biopsies  Plan to obtain GYN records (patient states she possibly had pap smear in 10/2017, and sonogram)    Will continue to follow  Plan to discuss with Dr. Eliane Mims PGY 4    Gyn/Oncology Attending Addendum    Patient seen & examined with PGY4, imaging/labs and historical records were reviewed, and agree with above as noted.  Patient's health care proxy was present at bedside at the time of the H & P.  Briefly, Ms. Gee is a 73 year old P7 with past medical history of PE, DM & anal cancer status post chemoradiation admitted with large bowel obstruction now status post transverse loop colostomy with Dr. Hutton with intraoperative finding of peritoneal carcinomatosis.  Preoperative sigmoidoscopy was negative up to 15 cm (site of obstruction). Gyn R4    Patient seen at bedside. Further history obtained from patient and repeat PE done.     Addendum (repeat PE on 5/18)  SSE: grossly normal cervix, smooth surface. bimanual: implants/nodularities palpated behind posterior fornix. unable to mobilize uterus well.   patient unable to tolerate rectal exam. no nodularities around sphincter. perineum grossly normal. no bleeding noted    Able to get some records from [facility] 2007 when patient diagnosed with multiple PE bilaterally.   Patients oncologist: Dr. Felecia Rowe-- records show she had anal carcinoma- squamous type. Patient given mitomycin, 5-FU and concomitant radiation by Dr. Aparicio.     As per conversation with patients PCP. Patient placed on lifelong anticoagulation due to unprovoked PE. States thrombophilia work up negative. Was unaware of patients cancer history    Patients phone number: [phone]  Neighbor (Sana Reza & health care proxy): [phone]    Plan to follow up peritoneal biopsies  Plan to obtain GYN records (patient states she possibly had pap smear in 10/2017, and sonogram)    Will continue to follow  Plan to discuss with Dr. Eliane Mims PGY 4    Gyn/Oncology Attending Addendum    Patient seen & examined with PGY4, imaging/labs and historical records were reviewed, and agree with above as noted.  Patient's health care proxy was present at bedside at the time of the H & P.  Briefly, Ms. Gee is a 73 year old P7 with past medical history of PE, DM & anal cancer status post chemoradiation admitted with large bowel obstruction now status post transverse loop colostomy with Dr. Hutton with intraoperative finding of peritoneal carcinomatosis.  Preoperative sigmoidoscopy was negative up to 15 cm (site of obstruction). Breast exam is normal without masses, nipple discharge or retraction (no mammogram in several years as she is not amenable to undergoing the test).  Pelvic/rectovaginal examination reveals fixed nodular mass effect midline to left measuring approximately 6 - 8 cm with compression of rectosigmoid.  Patient reports undergoing pap & TV/pelvic ultrasound in September; she is unaware of these test results.      The patient was advised that I will follow up the final pathology result.  If findings are consistent with gynecologic malignancy, she will be seen in my office in order to establish a definitive treatment plan inclusive of neoadjuvant chemotherapy and interval cytoreductive surgery (dependent upon treatment response).  She was informed of the tumor marker results revealing an elevated  & normal CEA.  She was provided my office & contact information, and her questions were answered to her apparent satisfaction.      Brenda Del Rio MD  [phone], ext. [phone]

## 2018-05-18 NOTE — PROGRESS NOTE ADULT - ASSESSMENT
Impression:  1. Abdominal pain/distension - due to large bowel obstruction from ?GYN malignancy vs less likely colonic cancer - s/p loop colostomy  2. Remote PE still on anticoagulation  3. ?Coffee ground emesis - stable H/H, no further vomiting    Recommend:  -Follow-up pathology from surgical specimen  -Monitor CBC - no coffee ground emesis; if recurs, can consider EGD; EGD today held off per primary team today  -Plan as per surgical team

## 2018-05-18 NOTE — OCCUPATIONAL THERAPY INITIAL EVALUATION ADULT - RANGE OF MOTION EXAMINATION, UPPER EXTREMITY
Left UE Active ROM was WFL (within functional limits)/RUE: shoulder <1/2 AROM, elbow WFL, wrist 1/2 AROM and digit flexion/ext minimal -as per patient this is due to old brachial plexus injury

## 2018-05-19 LAB
ANION GAP SERPL CALC-SCNC: 9 MMOL/L — SIGNIFICANT CHANGE UP (ref 5–17)
APTT BLD: 28.1 SEC — SIGNIFICANT CHANGE UP (ref 27.5–37.4)
BUN SERPL-MCNC: 15 MG/DL — SIGNIFICANT CHANGE UP (ref 7–23)
CALCIUM SERPL-MCNC: 8.3 MG/DL — LOW (ref 8.4–10.5)
CHLORIDE SERPL-SCNC: 107 MMOL/L — SIGNIFICANT CHANGE UP (ref 96–108)
CO2 SERPL-SCNC: 22 MMOL/L — SIGNIFICANT CHANGE UP (ref 22–31)
CREAT SERPL-MCNC: 1.14 MG/DL — SIGNIFICANT CHANGE UP (ref 0.5–1.3)
GLUCOSE BLDC GLUCOMTR-MCNC: 108 MG/DL — HIGH (ref 70–99)
GLUCOSE BLDC GLUCOMTR-MCNC: 147 MG/DL — HIGH (ref 70–99)
GLUCOSE BLDC GLUCOMTR-MCNC: 98 MG/DL — SIGNIFICANT CHANGE UP (ref 70–99)
GLUCOSE SERPL-MCNC: 93 MG/DL — SIGNIFICANT CHANGE UP (ref 70–99)
HCT VFR BLD CALC: 32.8 % — LOW (ref 34.5–45)
HGB BLD-MCNC: 11 G/DL — LOW (ref 11.5–15.5)
INR BLD: 1.14 RATIO — SIGNIFICANT CHANGE UP (ref 0.88–1.16)
MAGNESIUM SERPL-MCNC: 1.9 MG/DL — SIGNIFICANT CHANGE UP (ref 1.6–2.6)
MCHC RBC-ENTMCNC: 28.1 PG — SIGNIFICANT CHANGE UP (ref 27–34)
MCHC RBC-ENTMCNC: 33.5 GM/DL — SIGNIFICANT CHANGE UP (ref 32–36)
MCV RBC AUTO: 83.7 FL — SIGNIFICANT CHANGE UP (ref 80–100)
PHOSPHATE SERPL-MCNC: 2.5 MG/DL — SIGNIFICANT CHANGE UP (ref 2.5–4.5)
PLATELET # BLD AUTO: 207 K/UL — SIGNIFICANT CHANGE UP (ref 150–400)
POTASSIUM SERPL-MCNC: 3.2 MMOL/L — LOW (ref 3.5–5.3)
POTASSIUM SERPL-SCNC: 3.2 MMOL/L — LOW (ref 3.5–5.3)
PROTHROM AB SERPL-ACNC: 12.9 SEC — SIGNIFICANT CHANGE UP (ref 10–13.1)
RBC # BLD: 3.92 M/UL — SIGNIFICANT CHANGE UP (ref 3.8–5.2)
RBC # FLD: 15.5 % — HIGH (ref 10.3–14.5)
SODIUM SERPL-SCNC: 138 MMOL/L — SIGNIFICANT CHANGE UP (ref 135–145)
WBC # BLD: 6.35 K/UL — SIGNIFICANT CHANGE UP (ref 3.8–10.5)
WBC # FLD AUTO: 6.35 K/UL — SIGNIFICANT CHANGE UP (ref 3.8–10.5)

## 2018-05-19 RX ORDER — WARFARIN SODIUM 2.5 MG/1
2.5 TABLET ORAL ONCE
Qty: 0 | Refills: 0 | Status: COMPLETED | OUTPATIENT
Start: 2018-05-19 | End: 2018-05-19

## 2018-05-19 RX ORDER — SODIUM,POTASSIUM PHOSPHATES 278-250MG
1 POWDER IN PACKET (EA) ORAL
Qty: 0 | Refills: 0 | Status: COMPLETED | OUTPATIENT
Start: 2018-05-19 | End: 2018-05-20

## 2018-05-19 RX ORDER — MAGNESIUM SULFATE 500 MG/ML
1 VIAL (ML) INJECTION ONCE
Qty: 0 | Refills: 0 | Status: COMPLETED | OUTPATIENT
Start: 2018-05-19 | End: 2018-05-19

## 2018-05-19 RX ADMIN — Medication 100 GRAM(S): at 12:52

## 2018-05-19 RX ADMIN — ENOXAPARIN SODIUM 90 MILLIGRAM(S): 100 INJECTION SUBCUTANEOUS at 18:10

## 2018-05-19 RX ADMIN — Medication 1 TABLET(S): at 12:53

## 2018-05-19 RX ADMIN — Medication 1 TABLET(S): at 18:10

## 2018-05-19 RX ADMIN — ENOXAPARIN SODIUM 90 MILLIGRAM(S): 100 INJECTION SUBCUTANEOUS at 06:14

## 2018-05-19 RX ADMIN — WARFARIN SODIUM 2.5 MILLIGRAM(S): 2.5 TABLET ORAL at 22:09

## 2018-05-19 NOTE — PROGRESS NOTE ADULT - ATTENDING COMMENTS
pt feels much better, colostomy with >2liters output since OR    Pt was able to find some of her records that indicate that she had anal squamous cell CA that was treated partly at [facility] and partly at [place].     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 need for anticoagulation.  cont hep gtt for now until seen by amando.   case management to assess for VNS vs rehab as pt has limited mobility of her right hand and will need assistance with colostomy care pt feels much better, colostomy with >2liters output since OR    Pt was able to find some of her records that indicate that she had anal squamous cell CA that was treated partly at [facility] and partly at [place].     AAOx3  abd soft, NT/ND, colostomy pink with stool in bag, lap incisions c/d/i.    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 need for anticoagulation.  cont hep gtt for now until seen by amando.   case management to assess for VNS vs rehab as pt has limited mobility of her right hand and will need assistance with colostomy care

## 2018-05-19 NOTE — PROGRESS NOTE ADULT - ASSESSMENT
A/P  73F POD #2 s/p transverse loop colostomy. Ostomy functioning well.     - LRD  - T. Lovenox   - OOB/amb  - PT/OT eval  - ostomy teaching  - await path    [id number]

## 2018-05-19 NOTE — PROGRESS NOTE ADULT - SUBJECTIVE AND OBJECTIVE BOX
Red Team Surgery Progress Note     Subjective/24hour Events: Tolerating CLD without issue. Pain well controlled. Ostomy functioning.     Vital Signs:  Vital Signs Last 24 Hrs  T(C): 36.7 (19 May 2018 09:35), Max: 37 (18 May 2018 14:21)  T(F): 98.1 (19 May 2018 09:35), Max: 98.6 (18 May 2018 14:21)  HR: 68 (19 May 2018 09:35) (64 - 79)  BP: 148/66 (19 May 2018 09:35) (124/53 - 153/70)  BP(mean): --  RR: 18 (19 May 2018 09:35) (18 - 18)  SpO2: 98% (19 May 2018 09:35) (95% - 98%)    05-18-18 @ 07:01  -  05-19-18 @ 07:00  --------------------------------------------------------  IN: 2970 mL / OUT: 1850 mL / NET: 1120 mL    05-19-18 @ 07:01  -  05-19-18 @ 11:53  --------------------------------------------------------  IN: 280 mL / OUT: 675 mL / NET: -395 mL      MEDICATIONS  (STANDING):  dextrose 5%. 1000 milliLiter(s) (50 mL/Hr) IV Continuous <Continuous>  dextrose 50% Injectable 12.5 Gram(s) IV Push once  dextrose 50% Injectable 25 Gram(s) IV Push once  dextrose 50% Injectable 25 Gram(s) IV Push once  enoxaparin Injectable 90 milliGRAM(s) SubCutaneous every 12 hours  insulin lispro (HumaLOG) corrective regimen sliding scale   SubCutaneous Before meals and at bedtime  magnesium sulfate  IVPB 1 Gram(s) IV Intermittent once  potassium acid phosphate/sodium acid phosphate tablet (K-PHOS No. 2) 1 Tablet(s) Oral three times a day with meals    MEDICATIONS  (PRN):  benzocaine 15 mG/menthol 3.6 mG Lozenge 1 Lozenge Oral every 4 hours PRN throat pain  dextrose 40% Gel 15 Gram(s) Oral once PRN Blood Glucose LESS THAN 70 milliGRAM(s)/deciliter  glucagon  Injectable 1 milliGRAM(s) IntraMuscular once PRN Glucose LESS THAN 70 milligrams/deciliter    Physical Exam:  General: NAD, AOx3  Abdomen:soft, NT, ND, ostomy with stool and air in appliance, incision CDI      Labs:  CBC (05-19 @ 08:34)                              11.0<L>                         6.35    )----------------(  207        --    % Neutrophils, --    % Lymphocytes, ANC: --                                  32.8<L>              CBC (05-18 @ 09:58)                              12.0                           7.95    )----------------(  213        --    % Neutrophils, --    % Lymphocytes, ANC: --                                  36.1                  BMP (05-19 @ 07:09)             138     |  107     |  15    		Ca++ --      Ca 8.3<L>             ---------------------------------( 93    		Mg 1.9                3.2<L>  |  22      |  1.14  			Ph 2.5     BMP (05-18 @ 07:49)             140     |  106     |  22    		Ca++ --      Ca 8.8                ---------------------------------( 82    		Mg 2.1                3.8     |  24      |  1.20  			Ph 2.4<L>      Coags (05-19 @ 08:32)  aPTT 28.1 / INR 1.14 / PT 12.9  Coags (05-18 @ 07:55)  aPTT 165.9<HH> / INR 1.27<H> / PT 13.9<H>

## 2018-05-20 LAB
ANION GAP SERPL CALC-SCNC: 12 MMOL/L — SIGNIFICANT CHANGE UP (ref 5–17)
ANION GAP SERPL CALC-SCNC: 12 MMOL/L — SIGNIFICANT CHANGE UP (ref 5–17)
APTT BLD: 34.6 SEC — SIGNIFICANT CHANGE UP (ref 27.5–37.4)
BUN SERPL-MCNC: 10 MG/DL — SIGNIFICANT CHANGE UP (ref 7–23)
BUN SERPL-MCNC: 12 MG/DL — SIGNIFICANT CHANGE UP (ref 7–23)
CALCIUM SERPL-MCNC: 9 MG/DL — SIGNIFICANT CHANGE UP (ref 8.4–10.5)
CALCIUM SERPL-MCNC: 9 MG/DL — SIGNIFICANT CHANGE UP (ref 8.4–10.5)
CHLORIDE SERPL-SCNC: 106 MMOL/L — SIGNIFICANT CHANGE UP (ref 96–108)
CHLORIDE SERPL-SCNC: 108 MMOL/L — SIGNIFICANT CHANGE UP (ref 96–108)
CO2 SERPL-SCNC: 21 MMOL/L — LOW (ref 22–31)
CO2 SERPL-SCNC: 23 MMOL/L — SIGNIFICANT CHANGE UP (ref 22–31)
CREAT SERPL-MCNC: 1.1 MG/DL — SIGNIFICANT CHANGE UP (ref 0.5–1.3)
CREAT SERPL-MCNC: 1.12 MG/DL — SIGNIFICANT CHANGE UP (ref 0.5–1.3)
GLUCOSE BLDC GLUCOMTR-MCNC: 100 MG/DL — HIGH (ref 70–99)
GLUCOSE BLDC GLUCOMTR-MCNC: 103 MG/DL — HIGH (ref 70–99)
GLUCOSE BLDC GLUCOMTR-MCNC: 106 MG/DL — HIGH (ref 70–99)
GLUCOSE BLDC GLUCOMTR-MCNC: 85 MG/DL — SIGNIFICANT CHANGE UP (ref 70–99)
GLUCOSE BLDC GLUCOMTR-MCNC: 90 MG/DL — SIGNIFICANT CHANGE UP (ref 70–99)
GLUCOSE SERPL-MCNC: 115 MG/DL — HIGH (ref 70–99)
GLUCOSE SERPL-MCNC: 99 MG/DL — SIGNIFICANT CHANGE UP (ref 70–99)
HCT VFR BLD CALC: 36.3 % — SIGNIFICANT CHANGE UP (ref 34.5–45)
HGB BLD-MCNC: 11.6 G/DL — SIGNIFICANT CHANGE UP (ref 11.5–15.5)
INR BLD: 1.3 RATIO — HIGH (ref 0.88–1.16)
MAGNESIUM SERPL-MCNC: 2.2 MG/DL — SIGNIFICANT CHANGE UP (ref 1.6–2.6)
MCHC RBC-ENTMCNC: 27.4 PG — SIGNIFICANT CHANGE UP (ref 27–34)
MCHC RBC-ENTMCNC: 32 GM/DL — SIGNIFICANT CHANGE UP (ref 32–36)
MCV RBC AUTO: 85.8 FL — SIGNIFICANT CHANGE UP (ref 80–100)
PHOSPHATE SERPL-MCNC: 3 MG/DL — SIGNIFICANT CHANGE UP (ref 2.5–4.5)
PLATELET # BLD AUTO: 256 K/UL — SIGNIFICANT CHANGE UP (ref 150–400)
POTASSIUM SERPL-MCNC: 3.2 MMOL/L — LOW (ref 3.5–5.3)
POTASSIUM SERPL-MCNC: 3.8 MMOL/L — SIGNIFICANT CHANGE UP (ref 3.5–5.3)
POTASSIUM SERPL-SCNC: 3.2 MMOL/L — LOW (ref 3.5–5.3)
POTASSIUM SERPL-SCNC: 3.8 MMOL/L — SIGNIFICANT CHANGE UP (ref 3.5–5.3)
PROTHROM AB SERPL-ACNC: 14.3 SEC — HIGH (ref 9.8–12.7)
RBC # BLD: 4.23 M/UL — SIGNIFICANT CHANGE UP (ref 3.8–5.2)
RBC # FLD: 15.2 % — HIGH (ref 10.3–14.5)
SODIUM SERPL-SCNC: 140 MMOL/L — SIGNIFICANT CHANGE UP (ref 135–145)
SODIUM SERPL-SCNC: 143 MMOL/L — SIGNIFICANT CHANGE UP (ref 135–145)
WBC # BLD: 5.95 K/UL — SIGNIFICANT CHANGE UP (ref 3.8–10.5)
WBC # FLD AUTO: 5.95 K/UL — SIGNIFICANT CHANGE UP (ref 3.8–10.5)

## 2018-05-20 RX ORDER — POTASSIUM CHLORIDE 20 MEQ
40 PACKET (EA) ORAL EVERY 4 HOURS
Qty: 0 | Refills: 0 | Status: COMPLETED | OUTPATIENT
Start: 2018-05-20 | End: 2018-05-20

## 2018-05-20 RX ORDER — WARFARIN SODIUM 2.5 MG/1
5 TABLET ORAL ONCE
Qty: 0 | Refills: 0 | Status: COMPLETED | OUTPATIENT
Start: 2018-05-20 | End: 2018-05-20

## 2018-05-20 RX ADMIN — Medication 40 MILLIEQUIVALENT(S): at 09:45

## 2018-05-20 RX ADMIN — Medication 40 MILLIEQUIVALENT(S): at 16:33

## 2018-05-20 RX ADMIN — WARFARIN SODIUM 5 MILLIGRAM(S): 2.5 TABLET ORAL at 22:59

## 2018-05-20 RX ADMIN — ENOXAPARIN SODIUM 90 MILLIGRAM(S): 100 INJECTION SUBCUTANEOUS at 18:03

## 2018-05-20 RX ADMIN — Medication 1 TABLET(S): at 08:51

## 2018-05-20 RX ADMIN — ENOXAPARIN SODIUM 90 MILLIGRAM(S): 100 INJECTION SUBCUTANEOUS at 05:20

## 2018-05-20 NOTE — PROGRESS NOTE ADULT - SUBJECTIVE AND OBJECTIVE BOX
Red Team Surgery Progress Note     Subjective/24hour Events: No acute events overnight last night. Ostomy functioning. Pain controlled. Ambulating    Vital Signs:  Vital Signs Last 24 Hrs  T(C): 36.7 (20 May 2018 05:30), Max: 37.4 (19 May 2018 14:10)  T(F): 98.1 (20 May 2018 05:30), Max: 99.4 (19 May 2018 14:10)  HR: 64 (20 May 2018 05:30) (64 - 72)  BP: 133/75 (20 May 2018 05:30) (124/66 - 154/66)  BP(mean): --  RR: 18 (20 May 2018 05:30) (18 - 18)  SpO2: 98% (20 May 2018 05:30) (98% - 99%)    CAPILLARY BLOOD GLUCOSE      POCT Blood Glucose.: 98 mg/dL (19 May 2018 22:23)  POCT Blood Glucose.: 147 mg/dL (19 May 2018 17:36)  POCT Blood Glucose.: 108 mg/dL (19 May 2018 09:01)      I&O's Detail    18 May 2018 07:01  -  19 May 2018 07:00  --------------------------------------------------------  IN:    dextrose 5% + sodium chloride 0.45% with potassium chloride 20 mEq/L: 2400 mL    IV PiggyBack: 350 mL    Oral Fluid: 220 mL  Total IN: 2970 mL    OUT:    Colostomy: 500 mL    Voided: 1350 mL  Total OUT: 1850 mL    Total NET: 1120 mL      19 May 2018 07:01  -  20 May 2018 06:36  --------------------------------------------------------  IN:    Oral Fluid: 660 mL  Total IN: 660 mL    OUT:    Colostomy: 300 mL    Voided: 1000 mL  Total OUT: 1300 mL    Total NET: -640 mL            MEDICATIONS  (STANDING):  dextrose 5%. 1000 milliLiter(s) (50 mL/Hr) IV Continuous <Continuous>  dextrose 50% Injectable 12.5 Gram(s) IV Push once  dextrose 50% Injectable 25 Gram(s) IV Push once  dextrose 50% Injectable 25 Gram(s) IV Push once  enoxaparin Injectable 90 milliGRAM(s) SubCutaneous every 12 hours  insulin lispro (HumaLOG) corrective regimen sliding scale   SubCutaneous Before meals and at bedtime  potassium acid phosphate/sodium acid phosphate tablet (K-PHOS No. 2) 1 Tablet(s) Oral three times a day with meals    MEDICATIONS  (PRN):  benzocaine 15 mG/menthol 3.6 mG Lozenge 1 Lozenge Oral every 4 hours PRN throat pain  dextrose 40% Gel 15 Gram(s) Oral once PRN Blood Glucose LESS THAN 70 milliGRAM(s)/deciliter  glucagon  Injectable 1 milliGRAM(s) IntraMuscular once PRN Glucose LESS THAN 70 milligrams/deciliter        Physical Exam:  General: NAD, AOx3  Abdomen:soft, NT, ND, ostomy with stool and air in appliance, incision CDI      Labs:    05-19    138  |  107  |  15  ----------------------------<  93  3.2<L>   |  22  |  1.14    Ca    8.3<L>      19 May 2018 07:09  Phos  2.5     05-19  Mg     1.9     05-19                              11.0   6.35  )-----------( 207      ( 19 May 2018 08:34 )             32.8     PT/INR - ( 19 May 2018 08:32 )   PT: 12.9 sec;   INR: 1.14 ratio         PTT - ( 19 May 2018 08:32 )  PTT:28.1 sec      A/P  73F POD #3 s/p transverse loop colostomy. Ostomy functioning well.     - LRD  - T. Lovenox, f/u heme for outpatient A/C  - OOB/amb  - PT/OT eval  - ostomy teaching  - await path

## 2018-05-21 ENCOUNTER — TRANSCRIPTION ENCOUNTER (OUTPATIENT)
Age: 73
End: 2018-05-21

## 2018-05-21 VITALS
RESPIRATION RATE: 18 BRPM | HEART RATE: 65 BPM | OXYGEN SATURATION: 97 % | TEMPERATURE: 98 F | DIASTOLIC BLOOD PRESSURE: 75 MMHG | SYSTOLIC BLOOD PRESSURE: 151 MMHG

## 2018-05-21 LAB
ANION GAP SERPL CALC-SCNC: 10 MMOL/L — SIGNIFICANT CHANGE UP (ref 5–17)
APTT BLD: 30.8 SEC — SIGNIFICANT CHANGE UP (ref 27.5–37.4)
BUN SERPL-MCNC: 10 MG/DL — SIGNIFICANT CHANGE UP (ref 7–23)
CALCIUM SERPL-MCNC: 8.6 MG/DL — SIGNIFICANT CHANGE UP (ref 8.4–10.5)
CHLORIDE SERPL-SCNC: 110 MMOL/L — HIGH (ref 96–108)
CO2 SERPL-SCNC: 23 MMOL/L — SIGNIFICANT CHANGE UP (ref 22–31)
CREAT SERPL-MCNC: 1.09 MG/DL — SIGNIFICANT CHANGE UP (ref 0.5–1.3)
GLUCOSE BLDC GLUCOMTR-MCNC: 76 MG/DL — SIGNIFICANT CHANGE UP (ref 70–99)
GLUCOSE BLDC GLUCOMTR-MCNC: 96 MG/DL — SIGNIFICANT CHANGE UP (ref 70–99)
GLUCOSE SERPL-MCNC: 89 MG/DL — SIGNIFICANT CHANGE UP (ref 70–99)
HCT VFR BLD CALC: 31.4 % — LOW (ref 34.5–45)
HGB BLD-MCNC: 10.3 G/DL — LOW (ref 11.5–15.5)
INR BLD: 1.31 RATIO — HIGH (ref 0.88–1.16)
MAGNESIUM SERPL-MCNC: 1.9 MG/DL — SIGNIFICANT CHANGE UP (ref 1.6–2.6)
MCHC RBC-ENTMCNC: 27.5 PG — SIGNIFICANT CHANGE UP (ref 27–34)
MCHC RBC-ENTMCNC: 32.8 GM/DL — SIGNIFICANT CHANGE UP (ref 32–36)
MCV RBC AUTO: 84 FL — SIGNIFICANT CHANGE UP (ref 80–100)
PHOSPHATE SERPL-MCNC: 2.7 MG/DL — SIGNIFICANT CHANGE UP (ref 2.5–4.5)
PLATELET # BLD AUTO: 229 K/UL — SIGNIFICANT CHANGE UP (ref 150–400)
POTASSIUM SERPL-MCNC: 3.3 MMOL/L — LOW (ref 3.5–5.3)
POTASSIUM SERPL-SCNC: 3.3 MMOL/L — LOW (ref 3.5–5.3)
PROTHROM AB SERPL-ACNC: 14.9 SEC — HIGH (ref 10–13.1)
RBC # BLD: 3.74 M/UL — LOW (ref 3.8–5.2)
RBC # FLD: 15.5 % — HIGH (ref 10.3–14.5)
SODIUM SERPL-SCNC: 143 MMOL/L — SIGNIFICANT CHANGE UP (ref 135–145)
WBC # BLD: 4.79 K/UL — SIGNIFICANT CHANGE UP (ref 3.8–10.5)
WBC # FLD AUTO: 4.79 K/UL — SIGNIFICANT CHANGE UP (ref 3.8–10.5)

## 2018-05-21 PROCEDURE — 82378 CARCINOEMBRYONIC ANTIGEN: CPT

## 2018-05-21 PROCEDURE — 84100 ASSAY OF PHOSPHORUS: CPT

## 2018-05-21 PROCEDURE — 83036 HEMOGLOBIN GLYCOSYLATED A1C: CPT

## 2018-05-21 PROCEDURE — 85027 COMPLETE CBC AUTOMATED: CPT

## 2018-05-21 PROCEDURE — 85730 THROMBOPLASTIN TIME PARTIAL: CPT

## 2018-05-21 PROCEDURE — 83605 ASSAY OF LACTIC ACID: CPT

## 2018-05-21 PROCEDURE — 85610 PROTHROMBIN TIME: CPT

## 2018-05-21 PROCEDURE — 99285 EMERGENCY DEPT VISIT HI MDM: CPT | Mod: 25

## 2018-05-21 PROCEDURE — 88342 IMHCHEM/IMCYTCHM 1ST ANTB: CPT

## 2018-05-21 PROCEDURE — 97165 OT EVAL LOW COMPLEX 30 MIN: CPT

## 2018-05-21 PROCEDURE — 82330 ASSAY OF CALCIUM: CPT

## 2018-05-21 PROCEDURE — 80048 BASIC METABOLIC PNL TOTAL CA: CPT

## 2018-05-21 PROCEDURE — 86850 RBC ANTIBODY SCREEN: CPT

## 2018-05-21 PROCEDURE — 85014 HEMATOCRIT: CPT

## 2018-05-21 PROCEDURE — 86301 IMMUNOASSAY TUMOR CA 19-9: CPT

## 2018-05-21 PROCEDURE — 84132 ASSAY OF SERUM POTASSIUM: CPT

## 2018-05-21 PROCEDURE — 88112 CYTOPATH CELL ENHANCE TECH: CPT

## 2018-05-21 PROCEDURE — 86901 BLOOD TYPING SEROLOGIC RH(D): CPT

## 2018-05-21 PROCEDURE — 82803 BLOOD GASES ANY COMBINATION: CPT

## 2018-05-21 PROCEDURE — 96374 THER/PROPH/DIAG INJ IV PUSH: CPT

## 2018-05-21 PROCEDURE — 86900 BLOOD TYPING SEROLOGIC ABO: CPT

## 2018-05-21 PROCEDURE — 88341 IMHCHEM/IMCYTCHM EA ADD ANTB: CPT

## 2018-05-21 PROCEDURE — 83735 ASSAY OF MAGNESIUM: CPT

## 2018-05-21 PROCEDURE — 82947 ASSAY GLUCOSE BLOOD QUANT: CPT

## 2018-05-21 PROCEDURE — 84295 ASSAY OF SERUM SODIUM: CPT

## 2018-05-21 PROCEDURE — 86304 IMMUNOASSAY TUMOR CA 125: CPT

## 2018-05-21 PROCEDURE — 97161 PT EVAL LOW COMPLEX 20 MIN: CPT

## 2018-05-21 PROCEDURE — 88305 TISSUE EXAM BY PATHOLOGIST: CPT

## 2018-05-21 PROCEDURE — 82962 GLUCOSE BLOOD TEST: CPT

## 2018-05-21 PROCEDURE — 82435 ASSAY OF BLOOD CHLORIDE: CPT

## 2018-05-21 PROCEDURE — 80053 COMPREHEN METABOLIC PANEL: CPT

## 2018-05-21 RX ORDER — POTASSIUM CHLORIDE 20 MEQ
20 PACKET (EA) ORAL ONCE
Qty: 0 | Refills: 0 | Status: COMPLETED | OUTPATIENT
Start: 2018-05-21 | End: 2018-05-21

## 2018-05-21 RX ORDER — POTASSIUM CHLORIDE 20 MEQ
40 PACKET (EA) ORAL EVERY 4 HOURS
Qty: 0 | Refills: 0 | Status: DISCONTINUED | OUTPATIENT
Start: 2018-05-21 | End: 2018-05-21

## 2018-05-21 RX ADMIN — ENOXAPARIN SODIUM 90 MILLIGRAM(S): 100 INJECTION SUBCUTANEOUS at 05:13

## 2018-05-21 RX ADMIN — Medication 20 MILLIEQUIVALENT(S): at 12:54

## 2018-05-21 NOTE — DISCHARGE NOTE ADULT - HOSPITAL COURSE
73 year old female with history of PE on coumadin who presented with a chief complaint of abdominal distention. She initially presented to an OSH with complaint of SOB; CTPA was negative. CT Abd/Pelvis was obtained to evaluate for finding of abdominal tenderness and distention which revealed a LBO rectosigmoid stricture. She was on Coumadin for PE and transitioned to heparin gtt.  It was decided to take her to the OR and she underwent a colostomy.  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  The patient tolerated the procedure well (see operative report for full details)  GYN was consulted intraop and found to have peritoneal studding, normal appearing R ovary with 1cm simple appearing cyst. Fallopian tubes inflamed with studding/implants. Uterus not fully visualized but left/posterior uterus visualized and wnl. Left adnexae not fully visualized. possibly involved in firm mass along left pelvic sidewall that involves colon. Biopsies taken and fluid sent for cytology. Will await results. S/p flex sig with biopsy nonmalignant on 5/16. Obtained ,  and CEA tumor markers.   elevated at 137.  Postoperatively, diet was advanced with return of bowel function. Patient had history of peroneal carcinoma and hematologist was consulted regarding anticoagulation duration.  It was recommended to continue coumadin at this time.  She was provided education regarding ostomy care and home physical therapy will be setup with VNS. She will follow up with Dr. Danielle PCP for INR check. On day of discharge, the patient was tolerating diet, ambulating well and pain controlled. She will follow up with Dr. Hutton in 1 week, awaiting biopsy results, GYN and her oncologist.

## 2018-05-21 NOTE — DISCHARGE NOTE ADULT - CARE PROVIDER_API CALL
Brenda Del Rio), Gynecologic Oncology; Obstetrics and Gynecology  [address]]  [address]th [place] [address]  Phone: [phone]  Fax: [phone]    Otilio Hutton (MD), ColonRectal Surgery; Surgery  [address]]  [address]  [place] [address]  Phone: [phone]  Fax: [phone]    Nasir Britt (MD), Gastroenterology; Internal Medicine  Division of Gastroenterology  [address] [place] [id number]  Phone: [phone]  Fax: [phone]    Felecia Rowe (MD), Medical Oncology  [address]  [address]  [place] [address]  Phone: [phone]  Fax: [phone]

## 2018-05-21 NOTE — DISCHARGE NOTE ADULT - PLAN OF CARE
return to daily living activity prior to surgery, postoperative recovery WOUND CARE: Ostomy care as instructed by nursing staff.   BATHING: Please do not submerge wound underwater. You may shower and/or sponge bathe.  ACTIVITY: No heavy lifting anything more than 10-15lbs or straining. Otherwise, you may return to your usual level of physical activity.   DIET: Low fiber diet  NOTIFY YOUR SURGEON IF: You have any bleeding that does not stop, any pus draining from your wound, any fever (over 100.4 F) or chills, persistent nausea/vomiting with inability to tolerate food or liquids, lack of ostomy output <150cc in 24 hours or if your pain is not controlled on your discharge pain medications.  FOLLOW-UP:  1. Please call to make a follow-up appointment within one week of discharge with Dr. Hutton (See below for office information to call for an appointment)   2. Please follow up with your primary care physician in one week regarding your hospitalization. Please keep INR between 2.0 and 3.0 follow up with your PCP Dr. Samson in 24-48 hours of discharge to have your INR checked.

## 2018-05-21 NOTE — DISCHARGE NOTE ADULT - HOME CARE AGENCY
Your case has been referred to [facility]. A nurse will call you to schedule a visit the day after discharge. If you have any questions you may call [facility] at

## 2018-05-21 NOTE — DISCHARGE NOTE ADULT - ADDITIONAL INSTRUCTIONS
Follow up with Brenda Del Rio, Gynecology oncology MD  [phone], ext. [phone]  Please follow up with Dr. Balwinder KIM in 2-3 weeks (See below for office information to call for an appointment) and your private oncologist Dr. Rowe

## 2018-05-21 NOTE — PROGRESS NOTE ADULT - SUBJECTIVE AND OBJECTIVE BOX
Red Team Surgery Progress Note     Subjective/24hour Events: No acute events overnight. Tolerating diet, ostomy functioning    Vital Signs:  Vital Signs Last 24 Hrs  T(C): 36.8 (21 May 2018 05:04), Max: 37.2 (20 May 2018 13:45)  T(F): 98.2 (21 May 2018 05:04), Max: 99 (20 May 2018 13:45)  HR: 65 (21 May 2018 05:04) (60 - 77)  BP: 151/75 (21 May 2018 05:04) (132/66 - 151/75)  BP(mean): --  RR: 18 (21 May 2018 05:04) (18 - 18)  SpO2: 97% (21 May 2018 05:04) (97% - 99%)    CAPILLARY BLOOD GLUCOSE      POCT Blood Glucose.: 100 mg/dL (20 May 2018 22:22)  POCT Blood Glucose.: 103 mg/dL (20 May 2018 22:10)  POCT Blood Glucose.: 106 mg/dL (20 May 2018 18:10)  POCT Blood Glucose.: 90 mg/dL (20 May 2018 13:06)  POCT Blood Glucose.: 85 mg/dL (20 May 2018 08:45)      I&O's Detail    19 May 2018 07:01  -  20 May 2018 07:00  --------------------------------------------------------  IN:    Oral Fluid: 660 mL  Total IN: 660 mL    OUT:    Colostomy: 450 mL    Voided: 1200 mL  Total OUT: 1650 mL    Total NET: -990 mL      20 May 2018 07:01  -  21 May 2018 05:29  --------------------------------------------------------  IN:    Oral Fluid: 740 mL  Total IN: 740 mL    OUT:    Colostomy: 345 mL    Voided: 350 mL  Total OUT: 695 mL    Total NET: 45 mL            MEDICATIONS  (STANDING):  dextrose 5%. 1000 milliLiter(s) (50 mL/Hr) IV Continuous <Continuous>  dextrose 50% Injectable 12.5 Gram(s) IV Push once  dextrose 50% Injectable 25 Gram(s) IV Push once  dextrose 50% Injectable 25 Gram(s) IV Push once  enoxaparin Injectable 90 milliGRAM(s) SubCutaneous every 12 hours  insulin lispro (HumaLOG) corrective regimen sliding scale   SubCutaneous Before meals and at bedtime    MEDICATIONS  (PRN):  benzocaine 15 mG/menthol 3.6 mG Lozenge 1 Lozenge Oral every 4 hours PRN throat pain  dextrose 40% Gel 15 Gram(s) Oral once PRN Blood Glucose LESS THAN 70 milliGRAM(s)/deciliter  glucagon  Injectable 1 milliGRAM(s) IntraMuscular once PRN Glucose LESS THAN 70 milligrams/deciliter      Physical Exam:  General: NAD, AOx3  Abdomen:soft, NT, ND, ostomy with stool and air in appliance, incision CDI    Labs:    05-20    143  |  108  |  12  ----------------------------<  115<H>  3.8   |  23  |  1.10    Ca    9.0      20 May 2018 18:58  Phos  3.0     05-20  Mg     2.2     05-20                              11.6   5.95  )-----------( 256      ( 20 May 2018 07:53 )             36.3     PT/INR - ( 20 May 2018 22:23 )   PT: 14.3 sec;   INR: 1.30 ratio         PTT - ( 20 May 2018 22:23 )  PTT:34.6 sec      A/P  73F POD #4 s/p transverse loop colostomy. Ostomy functioning well.     - LRD  - T. Lovenox, f/u heme for outpatient A/C  - OOB/amb  - PT/OT eval  - ostomy teaching  - await path

## 2018-05-21 NOTE — DISCHARGE NOTE ADULT - PATIENT PORTAL LINK FT
You can access the [email or web address] Patient Portal, offered by [facility], by registering with the following website: [email or web address]

## 2018-05-21 NOTE — DISCHARGE NOTE ADULT - CARE PLAN
Principal Discharge DX:	Large bowel obstruction  Goal:	return to daily living activity prior to surgery, postoperative recovery  Assessment and plan of treatment:	WOUND CARE: Ostomy care as instructed by nursing staff.   BATHING: Please do not submerge wound underwater. You may shower and/or sponge bathe.  ACTIVITY: No heavy lifting anything more than 10-15lbs or straining. Otherwise, you may return to your usual level of physical activity.   DIET: Low fiber diet  NOTIFY YOUR SURGEON IF: You have any bleeding that does not stop, any pus draining from your wound, any fever (over 100.4 F) or chills, persistent nausea/vomiting with inability to tolerate food or liquids, lack of ostomy output <150cc in 24 hours or if your pain is not controlled on your discharge pain medications.  FOLLOW-UP:  1. Please call to make a follow-up appointment within one week of discharge with Dr. Hutton (See below for office information to call for an appointment)   2. Please follow up with your primary care physician in one week regarding your hospitalization.  Secondary Diagnosis:	Pulmonary embolism  Assessment and plan of treatment:	Please keep INR between 2.0 and 3.0 follow up with your PCP Dr. Samson in 24-48 hours of discharge to have your INR checked.

## 2018-05-21 NOTE — DISCHARGE NOTE ADULT - CARE PROVIDERS DIRECT ADDRESSES
[email or web address]

## 2018-05-21 NOTE — DISCHARGE NOTE ADULT - MEDICATION SUMMARY - MEDICATIONS TO TAKE
I will START or STAY ON the medications listed below when I get home from the hospital:    Tylenol 500 mg oral tablet  -- 2 tab(s) by mouth every 6 hours, As Needed  -- Indication: For Pain    Coumadin  --  by mouth   -- pt states she takes 70mg a week.  -- Indication: For anticoagulation    Coumadin 5 mg oral tablet  -- 1 tab(s) by mouth 4 times a week, Monday-Thurs  -- Indication: For anticoagulation    Coumadin 2.5 mg oral tablet  -- 1 tab(s) by mouth 3 times a week, Friday-Sunday  -- Indication: For anticoagulation

## 2018-05-22 PROBLEM — E11.9 TYPE 2 DIABETES MELLITUS WITHOUT COMPLICATIONS: Chronic | Status: ACTIVE | Noted: 2018-05-15

## 2018-05-23 ENCOUNTER — APPOINTMENT (OUTPATIENT)
Dept: INTERNAL MEDICINE | Facility: CLINIC | Age: 73
End: 2018-05-23
Payer: COMMERCIAL

## 2018-05-23 VITALS — HEART RATE: 80 BPM | DIASTOLIC BLOOD PRESSURE: 56 MMHG | SYSTOLIC BLOOD PRESSURE: 152 MMHG | RESPIRATION RATE: 12 BRPM

## 2018-05-23 VITALS — BODY MASS INDEX: 32.15 KG/M2 | HEIGHT: 65 IN | WEIGHT: 193 LBS

## 2018-05-23 DIAGNOSIS — Z97.4 PRESENCE OF EXTERNAL HEARING-AID: ICD-10-CM

## 2018-05-23 PROCEDURE — 99215 OFFICE O/P EST HI 40 MIN: CPT

## 2018-05-23 RX ORDER — TRIAMTERENE AND HYDROCHLOROTHIAZIDE 37.5; 25 MG/1; MG/1
37.5-25 CAPSULE ORAL
Qty: 90 | Refills: 0 | Status: DISCONTINUED | COMMUNITY
Start: 2017-03-05 | End: 2018-05-23

## 2018-05-23 NOTE — PHYSICAL EXAM
[No Acute Distress] : no acute distress [Well Nourished] : well nourished [Well Developed] : well developed [Well-Appearing] : well-appearing [Normal Sclera/Conjunctiva] : normal sclera/conjunctiva [PERRL] : pupils equal round and reactive to light [EOMI] : extraocular movements intact [Normal Outer Ear/Nose] : the outer ears and nose were normal in appearance [Normal Oropharynx] : the oropharynx was normal [No JVD] : no jugular venous distention [No Lymphadenopathy] : no lymphadenopathy [Thyroid Normal, No Nodules] : the thyroid was normal and there were no nodules present [No Respiratory Distress] : no respiratory distress  [Clear to Auscultation] : lungs were clear to auscultation bilaterally [No Accessory Muscle Use] : no accessory muscle use [Normal Rate] : normal rate  [Regular Rhythm] : with a regular rhythm [Normal S1, S2] : normal S1 and S2 [No Carotid Bruits] : no carotid bruits [No Abdominal Bruit] : a ~M bruit was not heard ~T in the abdomen [No Varicosities] : no varicosities [Pedal Pulses Present] : the pedal pulses are present [No Extremity Clubbing/Cyanosis] : no extremity clubbing/cyanosis [No Palpable Aorta] : no palpable aorta [Soft] : abdomen soft [Non Tender] : non-tender [Non-distended] : non-distended [No Masses] : no abdominal mass palpated [No HSM] : no HSM [Normal Bowel Sounds] : normal bowel sounds [Normal Supraclavicular Nodes] : no supraclavicular lymphadenopathy [Normal Posterior Cervical Nodes] : no posterior cervical lymphadenopathy [Normal Anterior Cervical Nodes] : no anterior cervical lymphadenopathy [No CVA Tenderness] : no CVA  tenderness [No Spinal Tenderness] : no spinal tenderness [Grossly Normal Strength/Tone] : grossly normal strength/tone [No Skin Lesions] : no skin lesions [Normal Gait] : normal gait [Coordination Grossly Intact] : coordination grossly intact [No Focal Deficits] : no focal deficits [Normal Affect] : the affect was normal [Alert and Oriented x3] : oriented to person, place, and time [de-identified] : wears hearing aides bilateral [de-identified] : no rhonchi [de-identified] : 2/6 JEOVANY [de-identified] : 2+ edema   [de-identified] :   Lg ventral hernia Colostomy with drain tubes.  2 lap surg sites with staples. [de-identified] : R arm atrophy and less swelling

## 2018-05-23 NOTE — HISTORY OF PRESENT ILLNESS
[FreeTextEntry1] : Hospitalized for colonic obstruction.  Diffuse metastatic disease.  Known hx of Sq Cell Ca.  Underwent colostomy.  \par Maintained on Lovenox for hx of PE probably related to hx of malignancy.  \par Needs f/up of HCVD obesity colostomy and A/C Pt on Coumadin 10 x 2 days.  last INR 1.31 in hosp.\par Pt states she feels well. \par Pt notes copious rectal mucus.

## 2018-05-23 NOTE — REVIEW OF SYSTEMS
[Negative] : Neurological [FreeTextEntry2] : see HPI [FreeTextEntry7] : see HPI  Less bloating [FreeTextEntry8] : see HPI [FreeTextEntry9] : RUE atrophy

## 2018-05-23 NOTE — ASSESSMENT
[FreeTextEntry1] : Metastatic obstructive colon CA ? Sq cell CA.  Colostomy in place.  Has copious anal mucus .  So far pathology from sigmoidoscopy was neg.  Await pathology from colostomy.  Needs f/up wound care of drains and staples. Will need oncology eval. \par [place].  Needs ENT eval for cerumen.  Refer to Dr Lloyd.  \par PE hx May be related to remote malignancy. pt on A-C for life.  \par HCVD Adequate control\par  RUE atrophy and swelling due to disuse from Brachial plexopathy\par Eye care Dr Coleman group.  Last visit 2013.  Advised to make appt.

## 2018-05-24 ENCOUNTER — RESULT CHARGE (OUTPATIENT)
Age: 73
End: 2018-05-24

## 2018-05-24 LAB
INR PPP: 1.8 RATIO
NON-GYNECOLOGICAL CYTOLOGY STUDY: SIGNIFICANT CHANGE UP

## 2018-05-24 PROCEDURE — 85610 PROTHROMBIN TIME: CPT | Mod: QW

## 2018-05-25 ENCOUNTER — APPOINTMENT (OUTPATIENT)
Dept: COLORECTAL SURGERY | Facility: CLINIC | Age: 73
End: 2018-05-25
Payer: COMMERCIAL

## 2018-05-25 VITALS
HEART RATE: 75 BPM | DIASTOLIC BLOOD PRESSURE: 67 MMHG | RESPIRATION RATE: 14 BRPM | SYSTOLIC BLOOD PRESSURE: 176 MMHG | TEMPERATURE: 98.7 F | OXYGEN SATURATION: 92 %

## 2018-05-25 PROCEDURE — 99024 POSTOP FOLLOW-UP VISIT: CPT

## 2018-05-29 ENCOUNTER — APPOINTMENT (OUTPATIENT)
Dept: GYNECOLOGIC ONCOLOGY | Facility: CLINIC | Age: 73
End: 2018-05-29

## 2018-05-30 ENCOUNTER — RECORD ABSTRACTING (OUTPATIENT)
Age: 73
End: 2018-05-30

## 2018-05-30 DIAGNOSIS — Z80.8 FAMILY HISTORY OF MALIGNANT NEOPLASM OF OTHER ORGANS OR SYSTEMS: ICD-10-CM

## 2018-05-31 ENCOUNTER — APPOINTMENT (OUTPATIENT)
Dept: GYNECOLOGIC ONCOLOGY | Facility: CLINIC | Age: 73
End: 2018-05-31

## 2018-06-04 ENCOUNTER — OUTPATIENT (OUTPATIENT)
Dept: OUTPATIENT SERVICES | Facility: HOSPITAL | Age: 73
LOS: 1 days | Discharge: HOME | End: 2018-06-04

## 2018-06-04 DIAGNOSIS — C34.90 MALIGNANT NEOPLASM OF UNSPECIFIED PART OF UNSPECIFIED BRONCHUS OR LUNG: ICD-10-CM

## 2018-06-04 DIAGNOSIS — Z98.890 OTHER SPECIFIED POSTPROCEDURAL STATES: Chronic | ICD-10-CM

## 2018-06-06 ENCOUNTER — APPOINTMENT (OUTPATIENT)
Dept: HEMATOLOGY ONCOLOGY | Facility: CLINIC | Age: 73
End: 2018-06-06
Payer: COMMERCIAL

## 2018-06-06 ENCOUNTER — RESULT REVIEW (OUTPATIENT)
Age: 73
End: 2018-06-06

## 2018-06-06 VITALS
BODY MASS INDEX: 32.91 KG/M2 | SYSTOLIC BLOOD PRESSURE: 165 MMHG | OXYGEN SATURATION: 100 % | RESPIRATION RATE: 16 BRPM | HEIGHT: 63.54 IN | DIASTOLIC BLOOD PRESSURE: 72 MMHG | HEART RATE: 64 BPM | TEMPERATURE: 99.1 F | WEIGHT: 188.05 LBS

## 2018-06-06 DIAGNOSIS — Z80.0 FAMILY HISTORY OF MALIGNANT NEOPLASM OF DIGESTIVE ORGANS: ICD-10-CM

## 2018-06-06 DIAGNOSIS — Z87.891 PERSONAL HISTORY OF NICOTINE DEPENDENCE: ICD-10-CM

## 2018-06-06 DIAGNOSIS — S43.016A ANTERIOR DISLOCATION OF UNSPECIFIED HUMERUS, INITIAL ENCOUNTER: ICD-10-CM

## 2018-06-06 DIAGNOSIS — Z80.6 FAMILY HISTORY OF LEUKEMIA: ICD-10-CM

## 2018-06-06 DIAGNOSIS — Z83.2 FAMILY HISTORY OF DISEASES OF THE BLOOD AND BLOOD-FORMING ORGANS AND CERTAIN DISORDERS INVOLVING THE IMMUNE MECHANISM: ICD-10-CM

## 2018-06-06 DIAGNOSIS — K56.609 UNSPECIFIED INTESTINAL OBSTRUCTION, UNSPECIFIED AS TO PARTIAL VERSUS COMPLETE OBSTRUCTION: ICD-10-CM

## 2018-06-06 DIAGNOSIS — Z80.3 FAMILY HISTORY OF MALIGNANT NEOPLASM OF BREAST: ICD-10-CM

## 2018-06-06 LAB
APTT BLD: 32.3 SEC
CANCER AG125 SERPL-ACNC: 228 U/ML
CEA SERPL-MCNC: 2.3 NG/ML
INR PPP: 2.31 RATIO
PT BLD: 26.6 SEC

## 2018-06-06 PROCEDURE — 99205 OFFICE O/P NEW HI 60 MIN: CPT

## 2018-06-07 PROBLEM — S43.016A ANTERIOR SHOULDER DISLOCATION: Status: RESOLVED | Noted: 2018-06-07 | Resolved: 2018-06-07

## 2018-06-08 LAB
ALBUMIN SERPL ELPH-MCNC: 3.5 G/DL
ALP BLD-CCNC: 117 U/L
ALT SERPL-CCNC: 13 U/L
ANION GAP SERPL CALC-SCNC: 13 MMOL/L
AST SERPL-CCNC: 24 U/L
BILIRUB SERPL-MCNC: 0.3 MG/DL
BUN SERPL-MCNC: 13 MG/DL
CALCIUM SERPL-MCNC: 9 MG/DL
CANCER AG19-9 SERPL-ACNC: <1 U/ML
CHLORIDE SERPL-SCNC: 107 MMOL/L
CO2 SERPL-SCNC: 25 MMOL/L
CREAT SERPL-MCNC: 1.18 MG/DL
GLUCOSE SERPL-MCNC: 88 MG/DL
HBV CORE IGG+IGM SER QL: NONREACTIVE
HBV SURFACE AB SER QL: NONREACTIVE
HBV SURFACE AG SER QL: NONREACTIVE
HCV AB SER QL: NONREACTIVE
HCV S/CO RATIO: 0.07 S/CO
POTASSIUM SERPL-SCNC: 3.4 MMOL/L
PROT SERPL-MCNC: 6.2 G/DL
SODIUM SERPL-SCNC: 145 MMOL/L

## 2018-06-12 ENCOUNTER — APPOINTMENT (OUTPATIENT)
Dept: GASTROENTEROLOGY | Facility: CLINIC | Age: 73
End: 2018-06-12
Payer: COMMERCIAL

## 2018-06-12 VITALS
OXYGEN SATURATION: 97 % | WEIGHT: 188 LBS | DIASTOLIC BLOOD PRESSURE: 63 MMHG | BODY MASS INDEX: 31.32 KG/M2 | RESPIRATION RATE: 16 BRPM | TEMPERATURE: 98.6 F | SYSTOLIC BLOOD PRESSURE: 158 MMHG | HEART RATE: 63 BPM | HEIGHT: 65 IN

## 2018-06-12 PROCEDURE — 99204 OFFICE O/P NEW MOD 45 MIN: CPT

## 2018-06-18 ENCOUNTER — APPOINTMENT (OUTPATIENT)
Dept: INTERNAL MEDICINE | Facility: CLINIC | Age: 73
End: 2018-06-18
Payer: COMMERCIAL

## 2018-06-18 VITALS — WEIGHT: 192 LBS | HEIGHT: 65 IN | BODY MASS INDEX: 31.99 KG/M2

## 2018-06-18 VITALS — HEART RATE: 66 BPM | RESPIRATION RATE: 12 BRPM | SYSTOLIC BLOOD PRESSURE: 140 MMHG | DIASTOLIC BLOOD PRESSURE: 60 MMHG

## 2018-06-18 PROCEDURE — 99214 OFFICE O/P EST MOD 30 MIN: CPT

## 2018-06-18 RX ORDER — RIVAROXABAN 20 MG/1
20 TABLET, FILM COATED ORAL
Qty: 90 | Refills: 3 | Status: ACTIVE | COMMUNITY
Start: 2018-06-18 | End: 1900-01-01

## 2018-06-18 NOTE — REVIEW OF SYSTEMS
[Negative] : Neurological [FreeTextEntry2] : see HPI [FreeTextEntry7] : see HPI   [FreeTextEntry8] : see HPI [FreeTextEntry9] : RUE atrophy

## 2018-06-18 NOTE — HISTORY OF PRESENT ILLNESS
[FreeTextEntry1] : Hospitalized for colonic obstruction.  Diffuse metastatic [place] CA.  Known hx of Sq Cell Ca.  Underwent colostomy.  \par Needs f/up of HCVD obesity colostomy and A/C Pt on Coumadin  \par Pt states she feels well and  has resumed her normal routine.  Able to walk 1/2 mile yesterday.  Self care of colostomy is successful. \par Pt notes copious rectal mucus.

## 2018-06-18 NOTE — ASSESSMENT
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 \par Colostomy in place.  \par [place].  Needs ENT eval for cerumen.  Refer to Dr Lloyd.  \par PE hx May be related to remote malignancy. pt on A-C for life.  OK to change to Xarelto.  \par HCVD Adequate control\par RUE atrophy and swelling due to disuse from Brachial plexopathy\par Eye care Dr Coleman group.  Last visit 2013.  Advised to make appt. \par End of life issues discussed.  Patient is aware that her life is full and she is independent and therefore should choose full resuscitation and direct her proxy to do the same.  \par Edema.  COnt stockings.

## 2018-06-18 NOTE — HEALTH RISK ASSESSMENT
[No falls in past year] : Patient reported no falls in the past year [0] : 2) Feeling down, depressed, or hopeless: Not at all (0) [] : No [[id number]] : 0

## 2018-06-19 ENCOUNTER — APPOINTMENT (OUTPATIENT)
Dept: HEMATOLOGY ONCOLOGY | Facility: CLINIC | Age: 73
End: 2018-06-19

## 2018-06-22 ENCOUNTER — APPOINTMENT (OUTPATIENT)
Dept: COLORECTAL SURGERY | Facility: CLINIC | Age: 73
End: 2018-06-22
Payer: COMMERCIAL

## 2018-06-22 PROCEDURE — 99024 POSTOP FOLLOW-UP VISIT: CPT

## 2018-06-22 RX ORDER — WARFARIN 10 MG/1
10 TABLET ORAL
Qty: 90 | Refills: 0 | Status: DISCONTINUED | COMMUNITY
Start: 2017-03-05 | End: 2018-06-22

## 2018-06-25 ENCOUNTER — FORM ENCOUNTER (OUTPATIENT)
Age: 73
End: 2018-06-25

## 2018-06-25 NOTE — BRIEF OPERATIVE NOTE - ANESTHESIOLOGIST NAME
Physical Therapy Daily Treatment     Visit Count: 6  Plan of Care Dates: Initial: 6/8/18 Through: 8/31/18  Insurance Information:   NO AUTHORIZATION REQUIRED  NO COPAY  60 VISIT LIMIT PER CALENDAR YEAR (OT ST PT), REFERENCE #   [id number]  RUKHSANA     DEDUCTIBLE - $ 3000 / $  MET  CO-INSURANCE - PAYS AT - 85 % AFTER DEDUCTIBLE IS MET  MAX OUT OF POCKET - $ 5000 / $ 4464.01 MET  Next Referring Provider Visit: 7/18/18    Referred by: Julio Walls MD  Medical Diagnosis (from order):  Z96.641 S/P hip replacement, right  Treatment Diagnosis: Hip Symptoms with Pain, Impaired Posture, Impaired Joint Mobility, Impaired Range of Motion, Impaired Gait/Locomotion Deficits, Impaired Mobility and Impaired Balance  Insurance: 1. [employer] CLAIMS  2. N/A    Date of Surgery: 6/5/18; surgery performed: Right CALLIE (posterior approach; rehabilitation guidelines: yes  Diagnosis Precautions: posterior total hip arthroplasty precautions  Chart reviewed: Relevant co-morbidities, allergies, tests and medications:  Hx of: Right foot medial hardware removal 2016, Left TKA 2016, Right ankle surgery 2015 (arthrodesis); Depression, chronic back pain, Fibromyalgia    SUBJECTIVE   Right knee is bothering her more then her hip.  State knee is stiff and achy.  Using the cane at home.  Has been massaging thigh/groin which is helping with pain.   Current Pain: 5/10 to Right anterior thigh, groin.    Functional Change: she can now sit on the toilet without leaning way to the left.    OBJECTIVE   Ambulate into session with walker   See initial evaluation    Treatment   Gait Training:  Adjusted height of cane; reported improved fit.  Ambulate 150 feet in the gym Instructed patient in use of single point cane in left hand to assist reduction of weight on right left.  Demonstrated appropriate gait pattern with slow speed.  Recommend increasing distance with cane in house, and community as tolerated.  X 5 minutes    Manual Therapy:   Gentle soft tissue  mobilization to right iliopsoas, discussed use of tennis ball at home for self massage  X 0 minutes    Therapeutic Exercise: x 35 minutes  Exercise Repetitions Sets Position/Cues   NuStep workload 2 6 minutes 1 Seat 10   Supine Quad sets, towel under ankle -  Right    Supine heel slides -  Right    Supine AAROM hip abduction 10 1 Cues to keepNo Therapist assist needed   Bridge with glute set 10 1 3 second hold; ball between knees   Supine short arc quad -     Sidelying clamshell 10 1 Pillow between knees   Seated hamstring curl AROM 10 1    Seated long arc quad vs yellow theraband  10 1    Seated knee extension - 1 Right    Seated hamstring stretch 30 sec 2 Right (<90 hip angle maintained)   Seated hip abduction vs band 10  Yellow band   Standing double heel raises -     Standing hip abduction 5 2 Bilateral    Standing partial squat 10     Standing hip extension -  Bilateral, sore to right hip during stance   Standing hamstring curl -     Standing in slight extension -     Standing marching alternating 15 1    Neuromuscular Reeducation Included in TE     Feet apart/together with eyes open and closed -     airex mat feet apart and together 60 seconds each 1    1/2 foam roll balance flat side up/down 60 seconds each 1    Modified tandem attempted  Unable to completed due to pain behind Right knee   Comments: completed on the Right unless otherwise reported      Current Home Program (not performed this date except as noted above):   Exercise: Date issued Date DC Comments   Heel slides, seated knee extension, mini squats, gluteal set 6/8/18-eval     Lay supine 6/18/18     Seated hamstring curl AROM, seated hamstring stretches 6/18/18                     ASSESSMENT   Adjusting cane assisted with improving gait mechanics.  Instructed to increase gait distance as tolerated with cane.  Progressed standing hip strengthening exercises.  Able to tolerate single leg stance on the Right to complete Right hip abduction.  Able to  complete supine hip abduction without assist; cues to keep toes to ceiling.    Pain after treatment: 6/10 to Right hip.  Encouraged massage and ice to assist with pain.  Result of above outlined education: Needs reinforcement    Goals:  To be obtained by end of this plan of care:  1. Patient independent with modified and progressed home exercise program.  2. Patient will decrease involved hip pain to 0/10  to aid in completion of self-care tasks/dressing and sleeping undisturbed through a night.   3. Patient will increase involved hip active range of motion to WFL° to aid in stair ambulation, ambulating a curb step and squatting for lifting for household independent living.  4. Patient will increase involved hip strength to 5/5 to aid in community ambulation for activities of independent living and normalization of gait for independent living.   5. Patient will be able to exit a building rapidly.  6. Patient will be able to ambulate safely and timely across the street.  7. Lower Extremity Functional Scale: Patient will complete form to reflect an improved score from initial score of 15 to greater than or equal to 75 (0=extreme difficulty; 80=no difficulty) to indicate pt reported improvement in function/disability/impairment (minimal detectable change: 9 points).     No goals currently met    PLAN   Review HEP and progress as tolerated.  Continue AROM exercises, strength and progress balance.  Gait training as needed.     THERAPY DAILY BILLING   Primary Insurance:  AETNA CLAIMS  Secondary Insurance: N/A    Evaluation Procedures:  No evaluation codes were used on this date of service    Timed Procedures:  Gait Training, 5 minutes  Therapeutic Exercise, 35 minutes    Untimed Procedures:  No untimed codes were used on this date of service    Total Treatment Time: 40 minutes    The referring provider's electronic or written signature on the evaluation authorizes the therapy plan of care.   Referring provider signature  on file    Luke

## 2018-06-26 ENCOUNTER — APPOINTMENT (OUTPATIENT)
Dept: HEMATOLOGY ONCOLOGY | Facility: CLINIC | Age: 73
End: 2018-06-26

## 2018-06-26 ENCOUNTER — OUTPATIENT (OUTPATIENT)
Dept: OUTPATIENT SERVICES | Facility: HOSPITAL | Age: 73
LOS: 1 days | End: 2018-06-26

## 2018-06-26 ENCOUNTER — RESULT REVIEW (OUTPATIENT)
Age: 73
End: 2018-06-26

## 2018-06-26 ENCOUNTER — OTHER (OUTPATIENT)
Age: 73
End: 2018-06-26

## 2018-06-26 ENCOUNTER — APPOINTMENT (OUTPATIENT)
Dept: NUCLEAR MEDICINE | Facility: IMAGING CENTER | Age: 73
End: 2018-06-26
Payer: COMMERCIAL

## 2018-06-26 ENCOUNTER — APPOINTMENT (OUTPATIENT)
Dept: MRI IMAGING | Facility: IMAGING CENTER | Age: 73
End: 2018-06-26
Payer: COMMERCIAL

## 2018-06-26 ENCOUNTER — OUTPATIENT (OUTPATIENT)
Dept: OUTPATIENT SERVICES | Facility: HOSPITAL | Age: 73
LOS: 1 days | End: 2018-06-26
Payer: MEDICARE

## 2018-06-26 DIAGNOSIS — Z98.890 OTHER SPECIFIED POSTPROCEDURAL STATES: Chronic | ICD-10-CM

## 2018-06-26 DIAGNOSIS — C80.1 MALIGNANT (PRIMARY) NEOPLASM, UNSPECIFIED: ICD-10-CM

## 2018-06-26 DIAGNOSIS — Z85.048 PERSONAL HISTORY OF OTHER MALIGNANT NEOPLASM OF RECTUM, RECTOSIGMOID JUNCTION, AND ANUS: ICD-10-CM

## 2018-06-26 DIAGNOSIS — Q45.3 OTHER CONGENITAL MALFORMATIONS OF PANCREAS AND PANCREATIC DUCT: ICD-10-CM

## 2018-06-26 LAB
BASOPHILS # BLD AUTO: 0 K/UL — SIGNIFICANT CHANGE UP (ref 0–0.2)
BASOPHILS NFR BLD AUTO: 0 % — SIGNIFICANT CHANGE UP (ref 0–2)
EOSINOPHIL # BLD AUTO: 0.1 K/UL — SIGNIFICANT CHANGE UP (ref 0–0.5)
EOSINOPHIL NFR BLD AUTO: 2.3 % — SIGNIFICANT CHANGE UP (ref 0–6)
HCT VFR BLD CALC: 35.9 % — SIGNIFICANT CHANGE UP (ref 34.5–45)
HGB BLD-MCNC: 11.5 G/DL — SIGNIFICANT CHANGE UP (ref 11.5–15.5)
LYMPHOCYTES # BLD AUTO: 1 K/UL — SIGNIFICANT CHANGE UP (ref 1–3.3)
LYMPHOCYTES # BLD AUTO: 19.9 % — SIGNIFICANT CHANGE UP (ref 13–44)
MCHC RBC-ENTMCNC: 28.4 PG — SIGNIFICANT CHANGE UP (ref 27–34)
MCHC RBC-ENTMCNC: 32.1 G/DL — SIGNIFICANT CHANGE UP (ref 32–36)
MCV RBC AUTO: 88.4 FL — SIGNIFICANT CHANGE UP (ref 80–100)
MONOCYTES # BLD AUTO: 0.6 K/UL — SIGNIFICANT CHANGE UP (ref 0–0.9)
MONOCYTES NFR BLD AUTO: 12.7 % — SIGNIFICANT CHANGE UP (ref 2–14)
NEUTROPHILS # BLD AUTO: 3.3 K/UL — SIGNIFICANT CHANGE UP (ref 1.8–7.4)
NEUTROPHILS NFR BLD AUTO: 65.1 % — SIGNIFICANT CHANGE UP (ref 43–77)
PLATELET # BLD AUTO: 241 K/UL — SIGNIFICANT CHANGE UP (ref 150–400)
RBC # BLD: 4.06 M/UL — SIGNIFICANT CHANGE UP (ref 3.8–5.2)
RBC # FLD: 14.1 % — SIGNIFICANT CHANGE UP (ref 10.3–14.5)
WBC # BLD: 5.1 K/UL — SIGNIFICANT CHANGE UP (ref 3.8–10.5)
WBC # FLD AUTO: 5.1 K/UL — SIGNIFICANT CHANGE UP (ref 3.8–10.5)

## 2018-06-26 PROCEDURE — 78815 PET IMAGE W/CT SKULL-THIGH: CPT

## 2018-06-26 PROCEDURE — 78815 PET IMAGE W/CT SKULL-THIGH: CPT | Mod: 26,PI

## 2018-06-26 PROCEDURE — 72197 MRI PELVIS W/O & W/DYE: CPT | Mod: 26

## 2018-06-26 PROCEDURE — 74183 MRI ABD W/O CNTR FLWD CNTR: CPT | Mod: 26

## 2018-06-26 PROCEDURE — 74183 MRI ABD W/O CNTR FLWD CNTR: CPT

## 2018-06-26 PROCEDURE — 72197 MRI PELVIS W/O & W/DYE: CPT

## 2018-06-26 PROCEDURE — A9552: CPT

## 2018-06-28 ENCOUNTER — OUTPATIENT (OUTPATIENT)
Dept: OUTPATIENT SERVICES | Facility: HOSPITAL | Age: 73
LOS: 1 days | Discharge: HOME | End: 2018-06-28
Payer: COMMERCIAL

## 2018-06-28 DIAGNOSIS — Z98.890 OTHER SPECIFIED POSTPROCEDURAL STATES: Chronic | ICD-10-CM

## 2018-06-28 DIAGNOSIS — C34.90 MALIGNANT NEOPLASM OF UNSPECIFIED PART OF UNSPECIFIED BRONCHUS OR LUNG: ICD-10-CM

## 2018-06-29 LAB
APTT BLD: 26.6 SEC
INR PPP: 1.73 RATIO
PT BLD: 19.8 SEC

## 2018-07-02 ENCOUNTER — APPOINTMENT (OUTPATIENT)
Dept: HEMATOLOGY ONCOLOGY | Facility: CLINIC | Age: 73
End: 2018-07-02
Payer: COMMERCIAL

## 2018-07-02 ENCOUNTER — OTHER (OUTPATIENT)
Age: 73
End: 2018-07-02

## 2018-07-02 VITALS
OXYGEN SATURATION: 96 % | DIASTOLIC BLOOD PRESSURE: 72 MMHG | RESPIRATION RATE: 16 BRPM | HEART RATE: 73 BPM | SYSTOLIC BLOOD PRESSURE: 173 MMHG | TEMPERATURE: 98.3 F | WEIGHT: 186.29 LBS | BODY MASS INDEX: 31 KG/M2

## 2018-07-02 DIAGNOSIS — K86.2 CYST OF PANCREAS: ICD-10-CM

## 2018-07-02 PROCEDURE — 99214 OFFICE O/P EST MOD 30 MIN: CPT

## 2018-07-02 PROCEDURE — 93010 ELECTROCARDIOGRAM REPORT: CPT

## 2018-07-05 ENCOUNTER — APPOINTMENT (OUTPATIENT)
Age: 73
End: 2018-07-05

## 2018-07-08 ENCOUNTER — FORM ENCOUNTER (OUTPATIENT)
Age: 73
End: 2018-07-08

## 2018-07-08 PROBLEM — K86.2 PANCREAS CYST: Status: ACTIVE | Noted: 2018-06-12

## 2018-07-09 ENCOUNTER — OTHER (OUTPATIENT)
Age: 73
End: 2018-07-09

## 2018-07-09 ENCOUNTER — OUTPATIENT (OUTPATIENT)
Dept: OUTPATIENT SERVICES | Facility: HOSPITAL | Age: 73
LOS: 1 days | End: 2018-07-09
Payer: COMMERCIAL

## 2018-07-09 DIAGNOSIS — Z98.890 OTHER SPECIFIED POSTPROCEDURAL STATES: Chronic | ICD-10-CM

## 2018-07-09 DIAGNOSIS — C20 MALIGNANT NEOPLASM OF RECTUM: ICD-10-CM

## 2018-07-09 DIAGNOSIS — K86.2 CYST OF PANCREAS: ICD-10-CM

## 2018-07-09 DIAGNOSIS — C80.1 MALIGNANT (PRIMARY) NEOPLASM, UNSPECIFIED: ICD-10-CM

## 2018-07-09 PROCEDURE — 36561 INSERT TUNNELED CV CATH: CPT

## 2018-07-09 PROCEDURE — 76937 US GUIDE VASCULAR ACCESS: CPT

## 2018-07-09 PROCEDURE — 76937 US GUIDE VASCULAR ACCESS: CPT | Mod: 26

## 2018-07-09 PROCEDURE — 77001 FLUOROGUIDE FOR VEIN DEVICE: CPT | Mod: 26

## 2018-07-09 PROCEDURE — C1769: CPT

## 2018-07-09 PROCEDURE — C1894: CPT

## 2018-07-09 PROCEDURE — C1788: CPT

## 2018-07-09 PROCEDURE — 77001 FLUOROGUIDE FOR VEIN DEVICE: CPT

## 2018-07-10 ENCOUNTER — APPOINTMENT (OUTPATIENT)
Dept: GASTROENTEROLOGY | Facility: HOSPITAL | Age: 73
End: 2018-07-10

## 2018-07-10 ENCOUNTER — OUTPATIENT (OUTPATIENT)
Dept: OUTPATIENT SERVICES | Facility: HOSPITAL | Age: 73
LOS: 1 days | End: 2018-07-10
Payer: COMMERCIAL

## 2018-07-10 ENCOUNTER — RESULT REVIEW (OUTPATIENT)
Age: 73
End: 2018-07-10

## 2018-07-10 DIAGNOSIS — Z45.2 ENCOUNTER FOR ADJUSTMENT AND MANAGEMENT OF VASCULAR ACCESS DEVICE: ICD-10-CM

## 2018-07-10 DIAGNOSIS — K86.2 CYST OF PANCREAS: ICD-10-CM

## 2018-07-10 DIAGNOSIS — Z98.890 OTHER SPECIFIED POSTPROCEDURAL STATES: Chronic | ICD-10-CM

## 2018-07-10 DIAGNOSIS — C80.1 MALIGNANT (PRIMARY) NEOPLASM, UNSPECIFIED: ICD-10-CM

## 2018-07-10 PROCEDURE — 43259 EGD US EXAM DUODENUM/JEJUNUM: CPT

## 2018-07-10 PROCEDURE — 45331 SIGMOIDOSCOPY AND BIOPSY: CPT

## 2018-07-11 ENCOUNTER — LABORATORY RESULT (OUTPATIENT)
Age: 73
End: 2018-07-11

## 2018-07-11 ENCOUNTER — APPOINTMENT (OUTPATIENT)
Age: 73
End: 2018-07-11

## 2018-07-11 ENCOUNTER — RESULT REVIEW (OUTPATIENT)
Age: 73
End: 2018-07-11

## 2018-07-11 DIAGNOSIS — R00.2 PALPITATIONS: ICD-10-CM

## 2018-07-11 LAB
BASOPHILS # BLD AUTO: 0 K/UL — SIGNIFICANT CHANGE UP (ref 0–0.2)
BASOPHILS NFR BLD AUTO: 0 % — SIGNIFICANT CHANGE UP (ref 0–2)
EOSINOPHIL # BLD AUTO: 0.1 K/UL — SIGNIFICANT CHANGE UP (ref 0–0.5)
EOSINOPHIL NFR BLD AUTO: 2.3 % — SIGNIFICANT CHANGE UP (ref 0–6)
HCT VFR BLD CALC: 32.7 % — LOW (ref 34.5–45)
HGB BLD-MCNC: 11.1 G/DL — LOW (ref 11.5–15.5)
LYMPHOCYTES # BLD AUTO: 0.8 K/UL — LOW (ref 1–3.3)
LYMPHOCYTES # BLD AUTO: 14.1 % — SIGNIFICANT CHANGE UP (ref 13–44)
MCHC RBC-ENTMCNC: 29.5 PG — SIGNIFICANT CHANGE UP (ref 27–34)
MCHC RBC-ENTMCNC: 33.9 G/DL — SIGNIFICANT CHANGE UP (ref 32–36)
MCV RBC AUTO: 87.1 FL — SIGNIFICANT CHANGE UP (ref 80–100)
MONOCYTES # BLD AUTO: 0.6 K/UL — SIGNIFICANT CHANGE UP (ref 0–0.9)
MONOCYTES NFR BLD AUTO: 10.4 % — SIGNIFICANT CHANGE UP (ref 2–14)
NEUTROPHILS # BLD AUTO: 4.4 K/UL — SIGNIFICANT CHANGE UP (ref 1.8–7.4)
NEUTROPHILS NFR BLD AUTO: 73.2 % — SIGNIFICANT CHANGE UP (ref 43–77)
PLATELET # BLD AUTO: 178 K/UL — SIGNIFICANT CHANGE UP (ref 150–400)
RBC # BLD: 3.75 M/UL — LOW (ref 3.8–5.2)
RBC # FLD: 13.7 % — SIGNIFICANT CHANGE UP (ref 10.3–14.5)
WBC # BLD: 6 K/UL — SIGNIFICANT CHANGE UP (ref 3.8–10.5)
WBC # FLD AUTO: 6 K/UL — SIGNIFICANT CHANGE UP (ref 3.8–10.5)

## 2018-07-12 DIAGNOSIS — Z51.11 ENCOUNTER FOR ANTINEOPLASTIC CHEMOTHERAPY: ICD-10-CM

## 2018-07-12 DIAGNOSIS — R11.2 NAUSEA WITH VOMITING, UNSPECIFIED: ICD-10-CM

## 2018-07-13 ENCOUNTER — APPOINTMENT (OUTPATIENT)
Age: 73
End: 2018-07-13

## 2018-07-19 PROBLEM — I26.99 OTHER PULMONARY EMBOLISM WITHOUT ACUTE COR PULMONALE: Chronic | Status: ACTIVE | Noted: 2018-05-15

## 2018-07-19 PROBLEM — E10.9 TYPE 1 DIABETES MELLITUS WITHOUT COMPLICATIONS: Chronic | Status: ACTIVE | Noted: 2018-05-15

## 2018-07-19 PROBLEM — S14.3XXS INJURY OF BRACHIAL PLEXUS, SEQUELA: Chronic | Status: ACTIVE | Noted: 2018-05-16

## 2018-07-25 ENCOUNTER — APPOINTMENT (OUTPATIENT)
Age: 73
End: 2018-07-25

## 2018-07-25 ENCOUNTER — RESULT REVIEW (OUTPATIENT)
Age: 73
End: 2018-07-25

## 2018-07-25 ENCOUNTER — LABORATORY RESULT (OUTPATIENT)
Age: 73
End: 2018-07-25

## 2018-07-25 ENCOUNTER — APPOINTMENT (OUTPATIENT)
Dept: HEMATOLOGY ONCOLOGY | Facility: CLINIC | Age: 73
End: 2018-07-25
Payer: COMMERCIAL

## 2018-07-25 VITALS
BODY MASS INDEX: 31 KG/M2 | OXYGEN SATURATION: 98 % | TEMPERATURE: 98 F | WEIGHT: 186.29 LBS | HEART RATE: 98 BPM | SYSTOLIC BLOOD PRESSURE: 130 MMHG | DIASTOLIC BLOOD PRESSURE: 70 MMHG | RESPIRATION RATE: 16 BRPM

## 2018-07-25 LAB
BASOPHILS # BLD AUTO: 0 K/UL — SIGNIFICANT CHANGE UP (ref 0–0.2)
BASOPHILS NFR BLD AUTO: 0.4 % — SIGNIFICANT CHANGE UP (ref 0–2)
EOSINOPHIL # BLD AUTO: 0.3 K/UL — SIGNIFICANT CHANGE UP (ref 0–0.5)
EOSINOPHIL NFR BLD AUTO: 5.8 % — SIGNIFICANT CHANGE UP (ref 0–6)
HCT VFR BLD CALC: 33 % — LOW (ref 34.5–45)
HGB BLD-MCNC: 10.9 G/DL — LOW (ref 11.5–15.5)
LYMPHOCYTES # BLD AUTO: 0.7 K/UL — LOW (ref 1–3.3)
LYMPHOCYTES # BLD AUTO: 13.3 % — SIGNIFICANT CHANGE UP (ref 13–44)
MCHC RBC-ENTMCNC: 28.6 PG — SIGNIFICANT CHANGE UP (ref 27–34)
MCHC RBC-ENTMCNC: 33.1 G/DL — SIGNIFICANT CHANGE UP (ref 32–36)
MCV RBC AUTO: 86.6 FL — SIGNIFICANT CHANGE UP (ref 80–100)
MONOCYTES # BLD AUTO: 0.5 K/UL — SIGNIFICANT CHANGE UP (ref 0–0.9)
MONOCYTES NFR BLD AUTO: 8.8 % — SIGNIFICANT CHANGE UP (ref 2–14)
NEUTROPHILS # BLD AUTO: 3.9 K/UL — SIGNIFICANT CHANGE UP (ref 1.8–7.4)
NEUTROPHILS NFR BLD AUTO: 71.8 % — SIGNIFICANT CHANGE UP (ref 43–77)
PLATELET # BLD AUTO: 181 K/UL — SIGNIFICANT CHANGE UP (ref 150–400)
RBC # BLD: 3.81 M/UL — SIGNIFICANT CHANGE UP (ref 3.8–5.2)
RBC # FLD: 13.8 % — SIGNIFICANT CHANGE UP (ref 10.3–14.5)
WBC # BLD: 5.5 K/UL — SIGNIFICANT CHANGE UP (ref 3.8–10.5)
WBC # FLD AUTO: 5.5 K/UL — SIGNIFICANT CHANGE UP (ref 3.8–10.5)

## 2018-07-25 PROCEDURE — 99214 OFFICE O/P EST MOD 30 MIN: CPT

## 2018-07-27 ENCOUNTER — APPOINTMENT (OUTPATIENT)
Age: 73
End: 2018-07-27

## 2018-07-30 ENCOUNTER — APPOINTMENT (OUTPATIENT)
Dept: INTERNAL MEDICINE | Facility: CLINIC | Age: 73
End: 2018-07-30
Payer: COMMERCIAL

## 2018-07-30 VITALS — RESPIRATION RATE: 12 BRPM | HEART RATE: 80 BPM | DIASTOLIC BLOOD PRESSURE: 54 MMHG | SYSTOLIC BLOOD PRESSURE: 142 MMHG

## 2018-07-30 VITALS — WEIGHT: 186 LBS | BODY MASS INDEX: 30.95 KG/M2

## 2018-07-30 DIAGNOSIS — E66.9 OBESITY, UNSPECIFIED: ICD-10-CM

## 2018-07-30 PROCEDURE — 99214 OFFICE O/P EST MOD 30 MIN: CPT

## 2018-07-30 NOTE — PHYSICAL EXAM
[No Acute Distress] : no acute distress [Well Nourished] : well nourished [Well Developed] : well developed [Well-Appearing] : well-appearing [Normal Sclera/Conjunctiva] : normal sclera/conjunctiva [PERRL] : pupils equal round and reactive to light [EOMI] : extraocular movements intact [Normal Outer Ear/Nose] : the outer ears and nose were normal in appearance [Normal Oropharynx] : the oropharynx was normal [No JVD] : no jugular venous distention [No Lymphadenopathy] : no lymphadenopathy [Thyroid Normal, No Nodules] : the thyroid was normal and there were no nodules present [No Respiratory Distress] : no respiratory distress  [Clear to Auscultation] : lungs were clear to auscultation bilaterally [No Accessory Muscle Use] : no accessory muscle use [Normal Rate] : normal rate  [Regular Rhythm] : with a regular rhythm [Normal S1, S2] : normal S1 and S2 [No Carotid Bruits] : no carotid bruits [No Abdominal Bruit] : a ~M bruit was not heard ~T in the abdomen [No Varicosities] : no varicosities [Pedal Pulses Present] : the pedal pulses are present [No Extremity Clubbing/Cyanosis] : no extremity clubbing/cyanosis [No Palpable Aorta] : no palpable aorta [Soft] : abdomen soft [Non Tender] : non-tender [Non-distended] : non-distended [No Masses] : no abdominal mass palpated [No HSM] : no HSM [Normal Bowel Sounds] : normal bowel sounds [Normal Supraclavicular Nodes] : no supraclavicular lymphadenopathy [Normal Posterior Cervical Nodes] : no posterior cervical lymphadenopathy [Normal Anterior Cervical Nodes] : no anterior cervical lymphadenopathy [No CVA Tenderness] : no CVA  tenderness [No Spinal Tenderness] : no spinal tenderness [Grossly Normal Strength/Tone] : grossly normal strength/tone [No Skin Lesions] : no skin lesions [Normal Gait] : normal gait [Coordination Grossly Intact] : coordination grossly intact [No Focal Deficits] : no focal deficits [Normal Affect] : the affect was normal [Alert and Oriented x3] : oriented to person, place, and time [Comprehensive Foot Exam Normal] : Right and left foot were examined and both feet are normal. No ulcers in either foot. Toes are normal and with full ROM.  Normal tactile sensation with monofilament testing throughout both feet [de-identified] : wears hearing aides bilateral  Deep dry impaction bilateral.  Right is worse.   [de-identified] : no rhonchi [de-identified] : 2/6 JEOVANY [de-identified] : 2+ edema   [de-identified] :   Lg ventral hernia Colostomy   [de-identified] : R arm atrophy

## 2018-07-30 NOTE — HEALTH RISK ASSESSMENT
[] : No [Two or more falls in past year] : Patient reported two or more falls in the past year [0] : 2) Feeling down, depressed, or hopeless: Not at all (0) [[id number]] : 0

## 2018-07-30 NOTE — ASSESSMENT
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 \par Colostomy in place.  \par [place].  Needs ENT eval for cerumen.  Refer to Dr Lloyd.  \par PE hx May be related to remote malignancy. pt on A-C for life.  Now on Xarelto.  \par HCVD Adequate control\par RUE atrophy and swelling due to disuse from Brachial plexopathy\par Eye care Dr Coleman [facility].  Last visit 2013.  Advised to make appt. \par End of life issues discussed.  Patient is aware that her life is full and she is independent and therefore should choose full resuscitation and direct her proxy to do the same.  \par Edema.  COnt stockings. \par GAMEZ.  MR. Fowler.  Doubt acute ischemia.  Appropriate to have cardiology eval.

## 2018-07-30 NOTE — HISTORY OF PRESENT ILLNESS
[FreeTextEntry1] : Hospitalized for colonic obstruction.  Diffuse metastatic Adeno CA.  Known hx of Sq Cell Ca.  Underwent colostomy.  \par Needs f/up of HCVD obesity colostomy and A/C Pt now on Xarelto \par SOme c/o GAMEZ.  An echo was suggested.  \par

## 2018-07-31 ENCOUNTER — OUTPATIENT (OUTPATIENT)
Dept: OUTPATIENT SERVICES | Facility: HOSPITAL | Age: 73
LOS: 1 days | Discharge: HOME | End: 2018-07-31

## 2018-07-31 DIAGNOSIS — Z98.890 OTHER SPECIFIED POSTPROCEDURAL STATES: Chronic | ICD-10-CM

## 2018-07-31 DIAGNOSIS — C34.90 MALIGNANT NEOPLASM OF UNSPECIFIED PART OF UNSPECIFIED BRONCHUS OR LUNG: ICD-10-CM

## 2018-08-08 ENCOUNTER — LABORATORY RESULT (OUTPATIENT)
Age: 73
End: 2018-08-08

## 2018-08-08 ENCOUNTER — APPOINTMENT (OUTPATIENT)
Dept: HEMATOLOGY ONCOLOGY | Facility: CLINIC | Age: 73
End: 2018-08-08
Payer: COMMERCIAL

## 2018-08-08 ENCOUNTER — RESULT REVIEW (OUTPATIENT)
Age: 73
End: 2018-08-08

## 2018-08-08 ENCOUNTER — APPOINTMENT (OUTPATIENT)
Age: 73
End: 2018-08-08

## 2018-08-08 VITALS — WEIGHT: 188.94 LBS | BODY MASS INDEX: 31.44 KG/M2

## 2018-08-08 LAB
BASOPHILS # BLD AUTO: 0 K/UL — SIGNIFICANT CHANGE UP (ref 0–0.2)
BASOPHILS NFR BLD AUTO: 0 % — SIGNIFICANT CHANGE UP (ref 0–2)
EOSINOPHIL # BLD AUTO: 0.3 K/UL — SIGNIFICANT CHANGE UP (ref 0–0.5)
EOSINOPHIL NFR BLD AUTO: 5.8 % — SIGNIFICANT CHANGE UP (ref 0–6)
HCT VFR BLD CALC: 32.7 % — LOW (ref 34.5–45)
HGB BLD-MCNC: 10.6 G/DL — LOW (ref 11.5–15.5)
LYMPHOCYTES # BLD AUTO: 0.8 K/UL — LOW (ref 1–3.3)
LYMPHOCYTES # BLD AUTO: 15.6 % — SIGNIFICANT CHANGE UP (ref 13–44)
MCHC RBC-ENTMCNC: 27.9 PG — SIGNIFICANT CHANGE UP (ref 27–34)
MCHC RBC-ENTMCNC: 32.5 G/DL — SIGNIFICANT CHANGE UP (ref 32–36)
MCV RBC AUTO: 86.1 FL — SIGNIFICANT CHANGE UP (ref 80–100)
MONOCYTES # BLD AUTO: 0.5 K/UL — SIGNIFICANT CHANGE UP (ref 0–0.9)
MONOCYTES NFR BLD AUTO: 10.5 % — SIGNIFICANT CHANGE UP (ref 2–14)
NEUTROPHILS # BLD AUTO: 3.4 K/UL — SIGNIFICANT CHANGE UP (ref 1.8–7.4)
NEUTROPHILS NFR BLD AUTO: 68.2 % — SIGNIFICANT CHANGE UP (ref 43–77)
PLATELET # BLD AUTO: 202 K/UL — SIGNIFICANT CHANGE UP (ref 150–400)
RBC # BLD: 3.8 M/UL — SIGNIFICANT CHANGE UP (ref 3.8–5.2)
RBC # FLD: 14.1 % — SIGNIFICANT CHANGE UP (ref 10.3–14.5)
WBC # BLD: 5 K/UL — SIGNIFICANT CHANGE UP (ref 3.8–10.5)
WBC # FLD AUTO: 5 K/UL — SIGNIFICANT CHANGE UP (ref 3.8–10.5)

## 2018-08-08 PROCEDURE — 99213 OFFICE O/P EST LOW 20 MIN: CPT

## 2018-08-09 DIAGNOSIS — Z51.11 ENCOUNTER FOR ANTINEOPLASTIC CHEMOTHERAPY: ICD-10-CM

## 2018-08-09 DIAGNOSIS — R11.2 NAUSEA WITH VOMITING, UNSPECIFIED: ICD-10-CM

## 2018-08-10 ENCOUNTER — APPOINTMENT (OUTPATIENT)
Age: 73
End: 2018-08-10

## 2018-08-22 ENCOUNTER — RESULT REVIEW (OUTPATIENT)
Age: 73
End: 2018-08-22

## 2018-08-22 ENCOUNTER — APPOINTMENT (OUTPATIENT)
Dept: HEMATOLOGY ONCOLOGY | Facility: CLINIC | Age: 73
End: 2018-08-22
Payer: COMMERCIAL

## 2018-08-22 ENCOUNTER — LABORATORY RESULT (OUTPATIENT)
Age: 73
End: 2018-08-22

## 2018-08-22 ENCOUNTER — APPOINTMENT (OUTPATIENT)
Age: 73
End: 2018-08-22

## 2018-08-22 VITALS
RESPIRATION RATE: 16 BRPM | BODY MASS INDEX: 28.25 KG/M2 | HEART RATE: 69 BPM | SYSTOLIC BLOOD PRESSURE: 147 MMHG | OXYGEN SATURATION: 99 % | WEIGHT: 169.75 LBS | TEMPERATURE: 98.2 F | DIASTOLIC BLOOD PRESSURE: 68 MMHG

## 2018-08-22 DIAGNOSIS — S61.411A LACERATION W/OUT FOREIGN BODY OF RIGHT HAND, INITIAL ENCOUNTER: ICD-10-CM

## 2018-08-22 LAB
BASOPHILS # BLD AUTO: 0 K/UL — SIGNIFICANT CHANGE UP (ref 0–0.2)
BASOPHILS NFR BLD AUTO: 0.4 % — SIGNIFICANT CHANGE UP (ref 0–2)
EOSINOPHIL # BLD AUTO: 0.3 K/UL — SIGNIFICANT CHANGE UP (ref 0–0.5)
EOSINOPHIL NFR BLD AUTO: 5 % — SIGNIFICANT CHANGE UP (ref 0–6)
HCT VFR BLD CALC: 33 % — LOW (ref 34.5–45)
HGB BLD-MCNC: 11 G/DL — LOW (ref 11.5–15.5)
LYMPHOCYTES # BLD AUTO: 1.4 K/UL — SIGNIFICANT CHANGE UP (ref 1–3.3)
LYMPHOCYTES # BLD AUTO: 24.5 % — SIGNIFICANT CHANGE UP (ref 13–44)
MCHC RBC-ENTMCNC: 28.6 PG — SIGNIFICANT CHANGE UP (ref 27–34)
MCHC RBC-ENTMCNC: 33.3 G/DL — SIGNIFICANT CHANGE UP (ref 32–36)
MCV RBC AUTO: 85.9 FL — SIGNIFICANT CHANGE UP (ref 80–100)
MONOCYTES # BLD AUTO: 0.6 K/UL — SIGNIFICANT CHANGE UP (ref 0–0.9)
MONOCYTES NFR BLD AUTO: 10.6 % — SIGNIFICANT CHANGE UP (ref 2–14)
NEUTROPHILS # BLD AUTO: 3.4 K/UL — SIGNIFICANT CHANGE UP (ref 1.8–7.4)
NEUTROPHILS NFR BLD AUTO: 59.5 % — SIGNIFICANT CHANGE UP (ref 43–77)
PLATELET # BLD AUTO: 221 K/UL — SIGNIFICANT CHANGE UP (ref 150–400)
RBC # BLD: 3.84 M/UL — SIGNIFICANT CHANGE UP (ref 3.8–5.2)
RBC # FLD: 14.8 % — HIGH (ref 10.3–14.5)
WBC # BLD: 5.6 K/UL — SIGNIFICANT CHANGE UP (ref 3.8–10.5)
WBC # FLD AUTO: 5.6 K/UL — SIGNIFICANT CHANGE UP (ref 3.8–10.5)

## 2018-08-22 PROCEDURE — 99214 OFFICE O/P EST MOD 30 MIN: CPT

## 2018-08-23 ENCOUNTER — APPOINTMENT (OUTPATIENT)
Dept: CARDIOLOGY | Facility: CLINIC | Age: 73
End: 2018-08-23
Payer: COMMERCIAL

## 2018-08-23 ENCOUNTER — NON-APPOINTMENT (OUTPATIENT)
Age: 73
End: 2018-08-23

## 2018-08-23 VITALS
BODY MASS INDEX: 28.66 KG/M2 | SYSTOLIC BLOOD PRESSURE: 140 MMHG | HEIGHT: 65 IN | OXYGEN SATURATION: 99 % | WEIGHT: 172 LBS | DIASTOLIC BLOOD PRESSURE: 60 MMHG | HEART RATE: 67 BPM

## 2018-08-23 DIAGNOSIS — R94.31 ABNORMAL ELECTROCARDIOGRAM [ECG] [EKG]: ICD-10-CM

## 2018-08-23 PROCEDURE — 99204 OFFICE O/P NEW MOD 45 MIN: CPT

## 2018-08-23 PROCEDURE — 93000 ELECTROCARDIOGRAM COMPLETE: CPT

## 2018-08-23 PROCEDURE — 93306 TTE W/DOPPLER COMPLETE: CPT

## 2018-08-24 ENCOUNTER — APPOINTMENT (OUTPATIENT)
Age: 73
End: 2018-08-24

## 2018-08-29 ENCOUNTER — APPOINTMENT (OUTPATIENT)
Dept: HEMATOLOGY ONCOLOGY | Facility: CLINIC | Age: 73
End: 2018-08-29

## 2018-08-29 ENCOUNTER — RESULT REVIEW (OUTPATIENT)
Age: 73
End: 2018-08-29

## 2018-08-29 ENCOUNTER — LABORATORY RESULT (OUTPATIENT)
Age: 73
End: 2018-08-29

## 2018-08-29 LAB
BASOPHILS # BLD AUTO: 0 K/UL — SIGNIFICANT CHANGE UP (ref 0–0.2)
BASOPHILS NFR BLD AUTO: 0.4 % — SIGNIFICANT CHANGE UP (ref 0–2)
EOSINOPHIL # BLD AUTO: 0.3 K/UL — SIGNIFICANT CHANGE UP (ref 0–0.5)
EOSINOPHIL NFR BLD AUTO: 7.2 % — HIGH (ref 0–6)
HCT VFR BLD CALC: 29.6 % — LOW (ref 34.5–45)
HGB BLD-MCNC: 9.7 G/DL — LOW (ref 11.5–15.5)
LYMPHOCYTES # BLD AUTO: 0.9 K/UL — LOW (ref 1–3.3)
LYMPHOCYTES # BLD AUTO: 27 % — SIGNIFICANT CHANGE UP (ref 13–44)
MCHC RBC-ENTMCNC: 28.3 PG — SIGNIFICANT CHANGE UP (ref 27–34)
MCHC RBC-ENTMCNC: 32.7 G/DL — SIGNIFICANT CHANGE UP (ref 32–36)
MCV RBC AUTO: 86.5 FL — SIGNIFICANT CHANGE UP (ref 80–100)
MONOCYTES # BLD AUTO: 0.5 K/UL — SIGNIFICANT CHANGE UP (ref 0–0.9)
MONOCYTES NFR BLD AUTO: 13.4 % — SIGNIFICANT CHANGE UP (ref 2–14)
NEUTROPHILS # BLD AUTO: 1.8 K/UL — SIGNIFICANT CHANGE UP (ref 1.8–7.4)
NEUTROPHILS NFR BLD AUTO: 52 % — SIGNIFICANT CHANGE UP (ref 43–77)
PLATELET # BLD AUTO: 209 K/UL — SIGNIFICANT CHANGE UP (ref 150–400)
RBC # BLD: 3.42 M/UL — LOW (ref 3.8–5.2)
RBC # FLD: 14.8 % — HIGH (ref 10.3–14.5)
WBC # BLD: 3.5 K/UL — LOW (ref 3.8–10.5)
WBC # FLD AUTO: 3.5 K/UL — LOW (ref 3.8–10.5)

## 2018-08-30 ENCOUNTER — OTHER (OUTPATIENT)
Age: 73
End: 2018-08-30

## 2018-08-30 ENCOUNTER — OUTPATIENT (OUTPATIENT)
Dept: OUTPATIENT SERVICES | Facility: HOSPITAL | Age: 73
LOS: 1 days | Discharge: HOME | End: 2018-08-30

## 2018-08-30 DIAGNOSIS — Z98.890 OTHER SPECIFIED POSTPROCEDURAL STATES: Chronic | ICD-10-CM

## 2018-08-30 DIAGNOSIS — C34.90 MALIGNANT NEOPLASM OF UNSPECIFIED PART OF UNSPECIFIED BRONCHUS OR LUNG: ICD-10-CM

## 2018-09-05 ENCOUNTER — RESULT REVIEW (OUTPATIENT)
Age: 73
End: 2018-09-05

## 2018-09-05 ENCOUNTER — FORM ENCOUNTER (OUTPATIENT)
Age: 73
End: 2018-09-05

## 2018-09-05 ENCOUNTER — APPOINTMENT (OUTPATIENT)
Age: 73
End: 2018-09-05

## 2018-09-05 ENCOUNTER — LABORATORY RESULT (OUTPATIENT)
Age: 73
End: 2018-09-05

## 2018-09-05 LAB
BASOPHILS # BLD AUTO: 0.1 K/UL — SIGNIFICANT CHANGE UP (ref 0–0.2)
BASOPHILS NFR BLD AUTO: 1.4 % — SIGNIFICANT CHANGE UP (ref 0–2)
EOSINOPHIL # BLD AUTO: 0.3 K/UL — SIGNIFICANT CHANGE UP (ref 0–0.5)
EOSINOPHIL NFR BLD AUTO: 6 % — SIGNIFICANT CHANGE UP (ref 0–6)
HCT VFR BLD CALC: 31.3 % — LOW (ref 34.5–45)
HGB BLD-MCNC: 10.5 G/DL — LOW (ref 11.5–15.5)
LYMPHOCYTES # BLD AUTO: 1.3 K/UL — SIGNIFICANT CHANGE UP (ref 1–3.3)
LYMPHOCYTES # BLD AUTO: 24.6 % — SIGNIFICANT CHANGE UP (ref 13–44)
MCHC RBC-ENTMCNC: 29 PG — SIGNIFICANT CHANGE UP (ref 27–34)
MCHC RBC-ENTMCNC: 33.6 G/DL — SIGNIFICANT CHANGE UP (ref 32–36)
MCV RBC AUTO: 86.4 FL — SIGNIFICANT CHANGE UP (ref 80–100)
MONOCYTES # BLD AUTO: 0.6 K/UL — SIGNIFICANT CHANGE UP (ref 0–0.9)
MONOCYTES NFR BLD AUTO: 11.8 % — SIGNIFICANT CHANGE UP (ref 2–14)
NEUTROPHILS # BLD AUTO: 2.9 K/UL — SIGNIFICANT CHANGE UP (ref 1.8–7.4)
NEUTROPHILS NFR BLD AUTO: 56.2 % — SIGNIFICANT CHANGE UP (ref 43–77)
PLATELET # BLD AUTO: 199 K/UL — SIGNIFICANT CHANGE UP (ref 150–400)
RBC # BLD: 3.63 M/UL — LOW (ref 3.8–5.2)
RBC # FLD: 15.6 % — HIGH (ref 10.3–14.5)
WBC # BLD: 5.1 K/UL — SIGNIFICANT CHANGE UP (ref 3.8–10.5)
WBC # FLD AUTO: 5.1 K/UL — SIGNIFICANT CHANGE UP (ref 3.8–10.5)

## 2018-09-05 RX ORDER — WARFARIN SODIUM 2.5 MG/1
1 TABLET ORAL
Qty: 0 | Refills: 0 | COMMUNITY

## 2018-09-06 ENCOUNTER — APPOINTMENT (OUTPATIENT)
Dept: ULTRASOUND IMAGING | Facility: IMAGING CENTER | Age: 73
End: 2018-09-06
Payer: COMMERCIAL

## 2018-09-06 ENCOUNTER — OUTPATIENT (OUTPATIENT)
Dept: OUTPATIENT SERVICES | Facility: HOSPITAL | Age: 73
LOS: 1 days | End: 2018-09-06
Payer: MEDICARE

## 2018-09-06 DIAGNOSIS — R11.2 NAUSEA WITH VOMITING, UNSPECIFIED: ICD-10-CM

## 2018-09-06 DIAGNOSIS — Z00.8 ENCOUNTER FOR OTHER GENERAL EXAMINATION: ICD-10-CM

## 2018-09-06 DIAGNOSIS — Z51.11 ENCOUNTER FOR ANTINEOPLASTIC CHEMOTHERAPY: ICD-10-CM

## 2018-09-06 DIAGNOSIS — Z98.890 OTHER SPECIFIED POSTPROCEDURAL STATES: Chronic | ICD-10-CM

## 2018-09-06 PROCEDURE — 76700 US EXAM ABDOM COMPLETE: CPT | Mod: 26

## 2018-09-06 PROCEDURE — 76700 US EXAM ABDOM COMPLETE: CPT

## 2018-09-07 ENCOUNTER — APPOINTMENT (OUTPATIENT)
Age: 73
End: 2018-09-07

## 2018-09-13 ENCOUNTER — FORM ENCOUNTER (OUTPATIENT)
Age: 73
End: 2018-09-13

## 2018-09-14 ENCOUNTER — APPOINTMENT (OUTPATIENT)
Dept: CT IMAGING | Facility: IMAGING CENTER | Age: 73
End: 2018-09-14
Payer: COMMERCIAL

## 2018-09-14 ENCOUNTER — OUTPATIENT (OUTPATIENT)
Dept: OUTPATIENT SERVICES | Facility: HOSPITAL | Age: 73
LOS: 1 days | End: 2018-09-14
Payer: COMMERCIAL

## 2018-09-14 DIAGNOSIS — Z98.890 OTHER SPECIFIED POSTPROCEDURAL STATES: Chronic | ICD-10-CM

## 2018-09-14 DIAGNOSIS — C80.1 MALIGNANT (PRIMARY) NEOPLASM, UNSPECIFIED: ICD-10-CM

## 2018-09-14 PROCEDURE — 71260 CT THORAX DX C+: CPT | Mod: 26

## 2018-09-14 PROCEDURE — 74177 CT ABD & PELVIS W/CONTRAST: CPT | Mod: 26

## 2018-09-14 PROCEDURE — 82565 ASSAY OF CREATININE: CPT

## 2018-09-14 PROCEDURE — 74177 CT ABD & PELVIS W/CONTRAST: CPT

## 2018-09-14 PROCEDURE — 71260 CT THORAX DX C+: CPT

## 2018-09-19 ENCOUNTER — APPOINTMENT (OUTPATIENT)
Age: 73
End: 2018-09-19

## 2018-09-19 ENCOUNTER — RESULT REVIEW (OUTPATIENT)
Age: 73
End: 2018-09-19

## 2018-09-19 ENCOUNTER — APPOINTMENT (OUTPATIENT)
Dept: HEMATOLOGY ONCOLOGY | Facility: CLINIC | Age: 73
End: 2018-09-19
Payer: COMMERCIAL

## 2018-09-19 ENCOUNTER — LABORATORY RESULT (OUTPATIENT)
Age: 73
End: 2018-09-19

## 2018-09-19 VITALS
OXYGEN SATURATION: 100 % | SYSTOLIC BLOOD PRESSURE: 158 MMHG | WEIGHT: 174.16 LBS | DIASTOLIC BLOOD PRESSURE: 68 MMHG | TEMPERATURE: 98.2 F | RESPIRATION RATE: 16 BRPM | BODY MASS INDEX: 28.98 KG/M2 | HEART RATE: 67 BPM

## 2018-09-19 LAB
BASOPHILS # BLD AUTO: 0 K/UL — SIGNIFICANT CHANGE UP (ref 0–0.2)
BASOPHILS NFR BLD AUTO: 0 % — SIGNIFICANT CHANGE UP (ref 0–2)
EOSINOPHIL # BLD AUTO: 0.2 K/UL — SIGNIFICANT CHANGE UP (ref 0–0.5)
EOSINOPHIL NFR BLD AUTO: 4 % — SIGNIFICANT CHANGE UP (ref 0–6)
HCT VFR BLD CALC: 33.2 % — LOW (ref 34.5–45)
HGB BLD-MCNC: 10.9 G/DL — LOW (ref 11.5–15.5)
LYMPHOCYTES # BLD AUTO: 1.6 K/UL — SIGNIFICANT CHANGE UP (ref 1–3.3)
LYMPHOCYTES # BLD AUTO: 26.4 % — SIGNIFICANT CHANGE UP (ref 13–44)
MCHC RBC-ENTMCNC: 28.2 PG — SIGNIFICANT CHANGE UP (ref 27–34)
MCHC RBC-ENTMCNC: 32.7 G/DL — SIGNIFICANT CHANGE UP (ref 32–36)
MCV RBC AUTO: 86.3 FL — SIGNIFICANT CHANGE UP (ref 80–100)
MONOCYTES # BLD AUTO: 0.7 K/UL — SIGNIFICANT CHANGE UP (ref 0–0.9)
MONOCYTES NFR BLD AUTO: 11.4 % — SIGNIFICANT CHANGE UP (ref 2–14)
NEUTROPHILS # BLD AUTO: 3.4 K/UL — SIGNIFICANT CHANGE UP (ref 1.8–7.4)
NEUTROPHILS NFR BLD AUTO: 58.2 % — SIGNIFICANT CHANGE UP (ref 43–77)
PLATELET # BLD AUTO: 190 K/UL — SIGNIFICANT CHANGE UP (ref 150–400)
RBC # BLD: 3.85 M/UL — SIGNIFICANT CHANGE UP (ref 3.8–5.2)
RBC # FLD: 16.3 % — HIGH (ref 10.3–14.5)
WBC # BLD: 5.9 K/UL — SIGNIFICANT CHANGE UP (ref 3.8–10.5)
WBC # FLD AUTO: 5.9 K/UL — SIGNIFICANT CHANGE UP (ref 3.8–10.5)

## 2018-09-19 PROCEDURE — 99214 OFFICE O/P EST MOD 30 MIN: CPT

## 2018-09-21 ENCOUNTER — APPOINTMENT (OUTPATIENT)
Age: 73
End: 2018-09-21

## 2018-09-25 ENCOUNTER — OUTPATIENT (OUTPATIENT)
Dept: OUTPATIENT SERVICES | Facility: HOSPITAL | Age: 73
LOS: 1 days | Discharge: HOME | End: 2018-09-25

## 2018-09-25 DIAGNOSIS — Z98.890 OTHER SPECIFIED POSTPROCEDURAL STATES: Chronic | ICD-10-CM

## 2018-09-25 DIAGNOSIS — C34.90 MALIGNANT NEOPLASM OF UNSPECIFIED PART OF UNSPECIFIED BRONCHUS OR LUNG: ICD-10-CM

## 2018-10-01 ENCOUNTER — APPOINTMENT (OUTPATIENT)
Dept: HEMATOLOGY ONCOLOGY | Facility: CLINIC | Age: 73
End: 2018-10-01
Payer: COMMERCIAL

## 2018-10-01 VITALS
DIASTOLIC BLOOD PRESSURE: 63 MMHG | BODY MASS INDEX: 28.39 KG/M2 | HEART RATE: 63 BPM | RESPIRATION RATE: 16 BRPM | SYSTOLIC BLOOD PRESSURE: 158 MMHG | TEMPERATURE: 98.4 F | OXYGEN SATURATION: 100 % | WEIGHT: 170.62 LBS

## 2018-10-01 PROCEDURE — 99214 OFFICE O/P EST MOD 30 MIN: CPT

## 2018-10-03 ENCOUNTER — APPOINTMENT (OUTPATIENT)
Age: 73
End: 2018-10-03

## 2018-10-05 ENCOUNTER — APPOINTMENT (OUTPATIENT)
Age: 73
End: 2018-10-05

## 2018-10-08 NOTE — PROVIDER CONTACT NOTE (CRITICAL VALUE NOTIFICATION) - PERSON GIVING RESULT:
Agency/Facility Name: [facility]   Spoke To: Gladys  Outcome: Gladys transferred this CCA to [facility] (management).     Per [facility] :  Facility is accepting the patient and is able to take them today. CCA will need to fax d.c summary before 1400 and schedule transport.     KRUNAL Leal has been updated.      Aby Lopez

## 2018-10-09 ENCOUNTER — APPOINTMENT (OUTPATIENT)
Dept: WOUND CARE | Facility: CLINIC | Age: 73
End: 2018-10-09

## 2018-10-10 ENCOUNTER — APPOINTMENT (OUTPATIENT)
Dept: COLORECTAL SURGERY | Facility: CLINIC | Age: 73
End: 2018-10-10
Payer: COMMERCIAL

## 2018-10-10 PROCEDURE — 99213 OFFICE O/P EST LOW 20 MIN: CPT

## 2018-10-23 ENCOUNTER — APPOINTMENT (OUTPATIENT)
Dept: HEMATOLOGY ONCOLOGY | Facility: CLINIC | Age: 73
End: 2018-10-23
Payer: COMMERCIAL

## 2018-10-23 ENCOUNTER — LABORATORY RESULT (OUTPATIENT)
Age: 73
End: 2018-10-23

## 2018-10-23 ENCOUNTER — APPOINTMENT (OUTPATIENT)
Age: 73
End: 2018-10-23

## 2018-10-23 ENCOUNTER — RESULT REVIEW (OUTPATIENT)
Age: 73
End: 2018-10-23

## 2018-10-23 VITALS
RESPIRATION RATE: 16 BRPM | OXYGEN SATURATION: 99 % | HEART RATE: 73 BPM | WEIGHT: 167.55 LBS | TEMPERATURE: 98.9 F | SYSTOLIC BLOOD PRESSURE: 193 MMHG | BODY MASS INDEX: 27.88 KG/M2 | DIASTOLIC BLOOD PRESSURE: 70 MMHG

## 2018-10-23 LAB
BASOPHILS # BLD AUTO: 0 K/UL — SIGNIFICANT CHANGE UP (ref 0–0.2)
BASOPHILS NFR BLD AUTO: 0.3 % — SIGNIFICANT CHANGE UP (ref 0–2)
EOSINOPHIL # BLD AUTO: 0.1 K/UL — SIGNIFICANT CHANGE UP (ref 0–0.5)
EOSINOPHIL NFR BLD AUTO: 2.4 % — SIGNIFICANT CHANGE UP (ref 0–6)
HCT VFR BLD CALC: 32.7 % — LOW (ref 34.5–45)
HGB BLD-MCNC: 10.9 G/DL — LOW (ref 11.5–15.5)
LYMPHOCYTES # BLD AUTO: 1 K/UL — SIGNIFICANT CHANGE UP (ref 1–3.3)
LYMPHOCYTES # BLD AUTO: 20.8 % — SIGNIFICANT CHANGE UP (ref 13–44)
MCHC RBC-ENTMCNC: 29.4 PG — SIGNIFICANT CHANGE UP (ref 27–34)
MCHC RBC-ENTMCNC: 33.2 G/DL — SIGNIFICANT CHANGE UP (ref 32–36)
MCV RBC AUTO: 88.6 FL — SIGNIFICANT CHANGE UP (ref 80–100)
MONOCYTES # BLD AUTO: 0.5 K/UL — SIGNIFICANT CHANGE UP (ref 0–0.9)
MONOCYTES NFR BLD AUTO: 9.9 % — SIGNIFICANT CHANGE UP (ref 2–14)
NEUTROPHILS # BLD AUTO: 3.3 K/UL — SIGNIFICANT CHANGE UP (ref 1.8–7.4)
NEUTROPHILS NFR BLD AUTO: 66.6 % — SIGNIFICANT CHANGE UP (ref 43–77)
PLATELET # BLD AUTO: 195 K/UL — SIGNIFICANT CHANGE UP (ref 150–400)
RBC # BLD: 3.7 M/UL — LOW (ref 3.8–5.2)
RBC # FLD: 15.9 % — HIGH (ref 10.3–14.5)
WBC # BLD: 5 K/UL — SIGNIFICANT CHANGE UP (ref 3.8–10.5)
WBC # FLD AUTO: 5 K/UL — SIGNIFICANT CHANGE UP (ref 3.8–10.5)

## 2018-10-23 PROCEDURE — 99214 OFFICE O/P EST MOD 30 MIN: CPT

## 2018-10-24 DIAGNOSIS — Z51.11 ENCOUNTER FOR ANTINEOPLASTIC CHEMOTHERAPY: ICD-10-CM

## 2018-10-24 DIAGNOSIS — C80.1 MALIGNANT (PRIMARY) NEOPLASM, UNSPECIFIED: ICD-10-CM

## 2018-10-29 ENCOUNTER — OUTPATIENT (OUTPATIENT)
Dept: OUTPATIENT SERVICES | Facility: HOSPITAL | Age: 73
LOS: 1 days | Discharge: HOME | End: 2018-10-29

## 2018-10-29 ENCOUNTER — APPOINTMENT (OUTPATIENT)
Dept: INTERNAL MEDICINE | Facility: CLINIC | Age: 73
End: 2018-10-29
Payer: COMMERCIAL

## 2018-10-29 VITALS — DIASTOLIC BLOOD PRESSURE: 60 MMHG | SYSTOLIC BLOOD PRESSURE: 154 MMHG | RESPIRATION RATE: 12 BRPM | HEART RATE: 72 BPM

## 2018-10-29 DIAGNOSIS — N13.30 UNSPECIFIED HYDRONEPHROSIS: ICD-10-CM

## 2018-10-29 DIAGNOSIS — Z93.3 COLOSTOMY STATUS: ICD-10-CM

## 2018-10-29 DIAGNOSIS — C34.90 MALIGNANT NEOPLASM OF UNSPECIFIED PART OF UNSPECIFIED BRONCHUS OR LUNG: ICD-10-CM

## 2018-10-29 DIAGNOSIS — Z98.890 OTHER SPECIFIED POSTPROCEDURAL STATES: Chronic | ICD-10-CM

## 2018-10-29 DIAGNOSIS — N17.9 ACUTE KIDNEY FAILURE, UNSPECIFIED: ICD-10-CM

## 2018-10-29 DIAGNOSIS — G54.0 BRACHIAL PLEXUS DISORDERS: ICD-10-CM

## 2018-10-29 PROCEDURE — 99214 OFFICE O/P EST MOD 30 MIN: CPT

## 2018-10-29 RX ORDER — TRIAMTERENE AND HYDROCHLOROTHIAZIDE 37.5; 25 MG/1; MG/1
37.5-25 CAPSULE ORAL DAILY
Refills: 0 | Status: DISCONTINUED | COMMUNITY
Start: 2018-08-22 | End: 2018-10-29

## 2018-10-29 NOTE — ASSESSMENT
[FreeTextEntry1] : Metastatic obstructive colon CA with peritoneal carcinomatosis, rising Alk phos, 25 lb wt loss hydronephrosis yet patient appears cheerful and positive about her Dx and colostomy care.  Started new chemo therapy.\par Sq cell CA of anus 2002.   \par Colostomy in place.  Patient reports that the "pocketing" appears to have improved.  \par [place].  Wears bilat hearing aides.\par Large cerumen cleared from both ears.  \par PE hx May be related to remote malignancy. pt on A-C for life.  Cont Xarelto.  \par HCVD Adequate control.  Echo 8-18 showes mild MR and 60-60% EF\par RUE atrophy and swelling due to disuse from Brachial plexopathy\par Edema.  COnt stockings. Pt feels she no longer needs Dyazide qod.\par Patient declines flu and Pneumovax.

## 2018-10-29 NOTE — PHYSICAL EXAM
[No Acute Distress] : no acute distress [Well Nourished] : well nourished [Well Developed] : well developed [Well-Appearing] : well-appearing [Normal Sclera/Conjunctiva] : normal sclera/conjunctiva [PERRL] : pupils equal round and reactive to light [EOMI] : extraocular movements intact [Normal Outer Ear/Nose] : the outer ears and nose were normal in appearance [Normal Oropharynx] : the oropharynx was normal [No JVD] : no jugular venous distention [No Lymphadenopathy] : no lymphadenopathy [Thyroid Normal, No Nodules] : the thyroid was normal and there were no nodules present [No Respiratory Distress] : no respiratory distress  [Clear to Auscultation] : lungs were clear to auscultation bilaterally [No Accessory Muscle Use] : no accessory muscle use [Normal Rate] : normal rate  [Regular Rhythm] : with a regular rhythm [Normal S1, S2] : normal S1 and S2 [No Carotid Bruits] : no carotid bruits [No Abdominal Bruit] : a ~M bruit was not heard ~T in the abdomen [No Varicosities] : no varicosities [Pedal Pulses Present] : the pedal pulses are present [No Extremity Clubbing/Cyanosis] : no extremity clubbing/cyanosis [No Palpable Aorta] : no palpable aorta [Soft] : abdomen soft [Non Tender] : non-tender [Non-distended] : non-distended [No Masses] : no abdominal mass palpated [No HSM] : no HSM [Normal Bowel Sounds] : normal bowel sounds [Normal Supraclavicular Nodes] : no supraclavicular lymphadenopathy [Normal Posterior Cervical Nodes] : no posterior cervical lymphadenopathy [Normal Anterior Cervical Nodes] : no anterior cervical lymphadenopathy [No CVA Tenderness] : no CVA  tenderness [No Spinal Tenderness] : no spinal tenderness [Grossly Normal Strength/Tone] : grossly normal strength/tone [No Skin Lesions] : no skin lesions [Normal Gait] : normal gait [Coordination Grossly Intact] : coordination grossly intact [No Focal Deficits] : no focal deficits [Normal Affect] : the affect was normal [Alert and Oriented x3] : oriented to person, place, and time [Comprehensive Foot Exam Normal] : Right and left foot were examined and both feet are normal. No ulcers in either foot. Toes are normal and with full ROM.  Normal tactile sensation with monofilament testing throughout both feet [de-identified] : wears hearing aides bilateral  Deep dry impaction bilateral.  Right most cerumen removed.  L nearly all cerumen removed [de-identified] : no rhonchi [de-identified] : 2/6 JEOVANY [de-identified] : 2+ edema   [de-identified] :   Lg ventral hernia Colostomy   [de-identified] : R arm atrophy

## 2018-10-29 NOTE — HISTORY OF PRESENT ILLNESS
[FreeTextEntry1] : F/up for  Diffuse metastatic Adeno CA.  Loosing weight.  Lost 25 lbs since June. Appetite decreased.  \par CT abd 9-18 shows peritoneal carcinomatosis with small ascites and pulm nodules.  Hydronephrosis noted.  Labs show elevated alk phos.  Started new infusion. \par Known hx of Sq Cell Ca.of anus   \par Needs f/up of HCVD obesity colostomy and A/C Pt now on Xarelto \par SOme c/o GAMEZ.  Seems better.  Echo 8-18 showed mild MR and EF 60-65%\par Edema better with wt loss and not on Dyazide.   \par Seems to be tolerating colostomy despite problem of "pouching" kendy has improved that may need further surg if worse.  \par Mentally does not seem anxious.  \par

## 2018-10-29 NOTE — HEALTH RISK ASSESSMENT
[] : No [No falls in past year] : Patient reported no falls in the past year [0] : 2) Feeling down, depressed, or hopeless: Not at all (0) [[id number]] : 0

## 2018-11-06 ENCOUNTER — LABORATORY RESULT (OUTPATIENT)
Age: 73
End: 2018-11-06

## 2018-11-06 ENCOUNTER — APPOINTMENT (OUTPATIENT)
Age: 73
End: 2018-11-06

## 2018-11-06 ENCOUNTER — APPOINTMENT (OUTPATIENT)
Age: 73
End: 2018-11-06
Payer: COMMERCIAL

## 2018-11-06 VITALS
WEIGHT: 166.45 LBS | SYSTOLIC BLOOD PRESSURE: 130 MMHG | OXYGEN SATURATION: 99 % | HEART RATE: 85 BPM | DIASTOLIC BLOOD PRESSURE: 70 MMHG | BODY MASS INDEX: 27.7 KG/M2 | TEMPERATURE: 98.6 F | RESPIRATION RATE: 16 BRPM

## 2018-11-06 PROCEDURE — 99213 OFFICE O/P EST LOW 20 MIN: CPT

## 2018-11-07 DIAGNOSIS — Z51.11 ENCOUNTER FOR ANTINEOPLASTIC CHEMOTHERAPY: ICD-10-CM

## 2018-11-09 NOTE — REVIEW OF SYSTEMS
[Fatigue] : fatigue [Lower Ext Edema] : lower extremity edema [Negative] : Allergic/Immunologic [FreeTextEntry5] : LE edema improved

## 2018-11-09 NOTE — PHYSICAL EXAM
[Restricted in physically strenuous activity but ambulatory and able to carry out work of a light or sedentary nature] : Status 1- Restricted in physically strenuous activity but ambulatory and able to carry out work of a light or sedentary nature, e.g., light house work, office work [Normal] : affect appropriate [de-identified] : + mild LE edema  [de-identified] : + ostomy

## 2018-11-09 NOTE — HISTORY OF PRESENT ILLNESS
"Initial /78 (BP Location: Right arm, Patient Position: Chair, Cuff Size: Adult Regular)  Pulse 82  Temp 98.2  F (36.8  C) (Tympanic)  Resp 20  Ht 5' 6\" (1.676 m)  Wt 160 lb 3.2 oz (72.7 kg)  LMP  (LMP Unknown)  Breastfeeding? No  BMI 25.86 kg/m2 Estimated body mass index is 25.86 kg/(m^2) as calculated from the following:    Height as of this encounter: 5' 6\" (1.676 m).    Weight as of this encounter: 160 lb 3.2 oz (72.7 kg). .    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 Felecia Rowe in 2001 (history is unclear at present as minimal records and pt does not remember all details) presented to [facility] with SOB, increased abdominal girth, found to have LBO. Transferred to [facility] for further management. Had a colo but scope could not be passed and biopsy was nondiagnostic. Underwent ex lap and bypass with ostomy formation. Pathology c/w adenocarcinoma of unknown primary ( positive for NICOLE-EP4, MOC-31, CK7 and CDX2, negative for PAX 8, Calretinin, CK5/6, D240, CK20, PAX-8), suspicious for gastrointestinal, pancreaticobiliary primary. Post operative course uneventful. \par \par In review of available records, patient has had CT a/p performed in 10/17 noted a 1.6 cm pancreatic cyst for which an MRI was recommended. CT scan from 5/18 shows stable pancreatic cyst. \par \par \par 7/10/18 EUS and flex sig. Pancreatic cyst without malignant features, not biopsied. Flex sig, tight stricture at 20 cm, normal overlying mucosa, likely extrinsic compression. Bx: adenocarcinoma. \par 7/11/18: C1 FOLFIRI (2400 mg/m2, no bolus, leucovorin 200 mg/m2, irinotecan 150 mg/m2)\par 7/25/18: C2 \par 8/8/18: C3\par 8/22/18: C4\par 9/5/18: C5 \par 9/14/18: CT CAP: POD, peritoneal mets, ?GB mass \par 10/23/18: C1 Nivolumab 240 mg\par 11/6/18: C2 Nivolumab 240 mg \par  [de-identified] : adenocarcinoma [de-identified] : 6/8/18:  227 CEA: 2.3 [de-identified] : PDL1 1% [de-identified] : Cindy presents for f/u, C2 Nivolumab. Tolerated C1 well without any adverse effects. Still having issues with ostomy. Has not received new supplies yet. Mood is low today. Most of the issues center around the problems with the ostomy. \par Ostomy output is more solid. \par Appetite is not very good, but weight is stable. \par A little discomfort in the abdomen, mild. \par Energy level is low. Lays around the house most of the day. \par pt called a few days ago c/o dysuria, foul smelling urine. Started Cipro 250 mg BID, still with mild dysuria left.

## 2018-11-20 ENCOUNTER — RESULT REVIEW (OUTPATIENT)
Age: 73
End: 2018-11-20

## 2018-11-20 ENCOUNTER — APPOINTMENT (OUTPATIENT)
Age: 73
End: 2018-11-20

## 2018-11-20 ENCOUNTER — LABORATORY RESULT (OUTPATIENT)
Age: 73
End: 2018-11-20

## 2018-11-20 ENCOUNTER — APPOINTMENT (OUTPATIENT)
Age: 73
End: 2018-11-20
Payer: COMMERCIAL

## 2018-11-20 VITALS
RESPIRATION RATE: 17 BRPM | OXYGEN SATURATION: 96 % | HEART RATE: 87 BPM | DIASTOLIC BLOOD PRESSURE: 71 MMHG | TEMPERATURE: 97.8 F | SYSTOLIC BLOOD PRESSURE: 158 MMHG | WEIGHT: 160.5 LBS | BODY MASS INDEX: 26.71 KG/M2

## 2018-11-20 DIAGNOSIS — R14.0 ABDOMINAL DISTENSION (GASEOUS): ICD-10-CM

## 2018-11-20 LAB
BASOPHILS # BLD AUTO: 0 K/UL — SIGNIFICANT CHANGE UP (ref 0–0.2)
BASOPHILS NFR BLD AUTO: 0.7 % — SIGNIFICANT CHANGE UP (ref 0–2)
EOSINOPHIL # BLD AUTO: 0.1 K/UL — SIGNIFICANT CHANGE UP (ref 0–0.5)
EOSINOPHIL NFR BLD AUTO: 1.5 % — SIGNIFICANT CHANGE UP (ref 0–6)
HCT VFR BLD CALC: 33.9 % — LOW (ref 34.5–45)
HGB BLD-MCNC: 11.2 G/DL — LOW (ref 11.5–15.5)
LYMPHOCYTES # BLD AUTO: 0.6 K/UL — LOW (ref 1–3.3)
LYMPHOCYTES # BLD AUTO: 11.2 % — LOW (ref 13–44)
MCHC RBC-ENTMCNC: 29 PG — SIGNIFICANT CHANGE UP (ref 27–34)
MCHC RBC-ENTMCNC: 32.9 G/DL — SIGNIFICANT CHANGE UP (ref 32–36)
MCV RBC AUTO: 88.1 FL — SIGNIFICANT CHANGE UP (ref 80–100)
MONOCYTES # BLD AUTO: 0.6 K/UL — SIGNIFICANT CHANGE UP (ref 0–0.9)
MONOCYTES NFR BLD AUTO: 10.8 % — SIGNIFICANT CHANGE UP (ref 2–14)
NEUTROPHILS # BLD AUTO: 4.4 K/UL — SIGNIFICANT CHANGE UP (ref 1.8–7.4)
NEUTROPHILS NFR BLD AUTO: 75.9 % — SIGNIFICANT CHANGE UP (ref 43–77)
PLATELET # BLD AUTO: 196 K/UL — SIGNIFICANT CHANGE UP (ref 150–400)
RBC # BLD: 3.85 M/UL — SIGNIFICANT CHANGE UP (ref 3.8–5.2)
RBC # FLD: 14.3 % — SIGNIFICANT CHANGE UP (ref 10.3–14.5)
WBC # BLD: 5.8 K/UL — SIGNIFICANT CHANGE UP (ref 3.8–10.5)
WBC # FLD AUTO: 5.8 K/UL — SIGNIFICANT CHANGE UP (ref 3.8–10.5)

## 2018-11-20 PROCEDURE — 99214 OFFICE O/P EST MOD 30 MIN: CPT

## 2018-11-20 RX ORDER — ACETAMINOPHEN 500 MG
2 TABLET ORAL
Qty: 0 | Refills: 0 | COMMUNITY

## 2018-11-20 RX ORDER — POTASSIUM CHLORIDE 1500 MG/1
20 TABLET, FILM COATED, EXTENDED RELEASE ORAL
Qty: 2 | Refills: 0 | Status: ACTIVE | COMMUNITY
Start: 2018-11-20 | End: 1900-01-01

## 2018-11-20 RX ORDER — CIPROFLOXACIN HYDROCHLORIDE 250 MG/1
250 TABLET, FILM COATED ORAL
Qty: 10 | Refills: 0 | Status: DISCONTINUED | COMMUNITY
Start: 2018-11-02 | End: 2018-11-20

## 2018-11-21 DIAGNOSIS — E86.0 DEHYDRATION: ICD-10-CM

## 2018-11-27 ENCOUNTER — OUTPATIENT (OUTPATIENT)
Dept: OUTPATIENT SERVICES | Facility: HOSPITAL | Age: 73
LOS: 1 days | Discharge: HOME | End: 2018-11-27

## 2018-11-27 DIAGNOSIS — Z98.890 OTHER SPECIFIED POSTPROCEDURAL STATES: Chronic | ICD-10-CM

## 2018-11-27 DIAGNOSIS — C34.90 MALIGNANT NEOPLASM OF UNSPECIFIED PART OF UNSPECIFIED BRONCHUS OR LUNG: ICD-10-CM

## 2018-12-04 ENCOUNTER — APPOINTMENT (OUTPATIENT)
Age: 73
End: 2018-12-04

## 2018-12-04 ENCOUNTER — APPOINTMENT (OUTPATIENT)
Dept: HEMATOLOGY ONCOLOGY | Facility: CLINIC | Age: 73
End: 2018-12-04

## 2018-12-06 PROBLEM — R14.0 ABDOMINAL DISTENTION: Status: ACTIVE | Noted: 2018-12-06

## 2018-12-06 NOTE — REVIEW OF SYSTEMS
[Fatigue] : fatigue [Recent Change In Weight] : ~T recent weight change [Lower Ext Edema] : lower extremity edema [Abdominal Pain] : abdominal pain [Muscle Weakness] : muscle weakness [Negative] : Allergic/Immunologic

## 2018-12-06 NOTE — CONSULT LETTER
[Dear  ___] : Dear  [unfilled], [Courtesy Letter:] : I had the pleasure of seeing your patient, [unfilled], in my office today. [Please see my note below.] : Please see my note below. [Consult Closing:] : Thank you very much for allowing me to participate in the care of this patient.  If you have any questions, please do not hesitate to contact me. [Sincerely,] : Sincerely, [FreeTextEntry3] : Aga Moore D.O. \par Attending Physician \par [facility] H[facility]\par  \par [facility] \par Tel: [phone]\par Fax: [phone]\par

## 2018-12-06 NOTE — PHYSICAL EXAM
[Restricted in physically strenuous activity but ambulatory and able to carry out work of a light or sedentary nature] : Status 1- Restricted in physically strenuous activity but ambulatory and able to carry out work of a light or sedentary nature, e.g., light house work, office work [Normal] : affect appropriate [de-identified] : + LE edema 1+ [de-identified] : abdomen is distended, nontender, BS +

## 2018-12-06 NOTE — HISTORY OF PRESENT ILLNESS
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 Felecia Rowe in 2001 (history is unclear at present as minimal records and pt does not remember all details) presented to [facility] with SOB, increased abdominal girth, found to have LBO. Transferred to [facility] for further management. Had a colo but scope could not be passed and biopsy was nondiagnostic. Underwent ex lap and bypass with ostomy formation. Pathology c/w adenocarcinoma of unknown primary ( positive for NICOLE-EP4, MOC-31, CK7 and CDX2, negative for PAX 8, Calretinin, CK5/6, D240, CK20, PAX-8), suspicious for gastrointestinal, pancreaticobiliary primary. Post operative course uneventful. \par \par In review of available records, patient has had CT a/p performed in 10/17 noted a 1.6 cm pancreatic cyst for which an MRI was recommended. CT scan from 5/18 shows stable pancreatic cyst. \par \par \par 7/10/18 EUS and flex sig. Pancreatic cyst without malignant features, not biopsied. Flex sig, tight stricture at 20 cm, normal overlying mucosa, likely extrinsic compression. Bx: adenocarcinoma. \par 7/11/18: C1 FOLFIRI (2400 mg/m2, no bolus, leucovorin 200 mg/m2, irinotecan 150 mg/m2)\par 7/25/18: C2 \par 8/8/18: C3\par 8/22/18: C4\par 9/5/18: C5 \par 9/14/18: CT CAP: POD, peritoneal mets, ?GB mass \par 10/23/18: C1 Nivolumab 240 mg\par 11/6/18: C2 Nivolumab 240 mg \par 11/20/18: C3 Nivolumab \par  [de-identified] : adenocarcinoma [de-identified] : 6/8/18:  227 CEA: 2.3 [de-identified] : PDL1 1% [de-identified] : Cindy presents for f/u. She has lost 7 lbs in the last 2 weeks. Denies any vomiting. Poor appetite. + early satiety. Eating very little. Feels very tired. \par She is still performing ADLs at home independently but does acknowledge that at times its hard. Feels her son (who does not live with her) and his wife are good support for her.. The son who lives with her is unfortunately not helpful. \par Her ostomy issues have improved since receiving new supplies. \par Denies any significant abdominal pain, but does c/o distention.

## 2018-12-10 ENCOUNTER — FORM ENCOUNTER (OUTPATIENT)
Age: 73
End: 2018-12-10

## 2018-12-11 ENCOUNTER — APPOINTMENT (OUTPATIENT)
Dept: CT IMAGING | Facility: IMAGING CENTER | Age: 73
End: 2018-12-11

## 2018-12-11 ENCOUNTER — OUTPATIENT (OUTPATIENT)
Dept: OUTPATIENT SERVICES | Facility: HOSPITAL | Age: 73
LOS: 1 days | End: 2018-12-11
Payer: COMMERCIAL

## 2018-12-11 DIAGNOSIS — C80.1 MALIGNANT (PRIMARY) NEOPLASM, UNSPECIFIED: ICD-10-CM

## 2018-12-11 DIAGNOSIS — Z98.890 OTHER SPECIFIED POSTPROCEDURAL STATES: Chronic | ICD-10-CM

## 2018-12-11 PROCEDURE — 71260 CT THORAX DX C+: CPT

## 2018-12-11 PROCEDURE — 71260 CT THORAX DX C+: CPT | Mod: 26

## 2018-12-11 PROCEDURE — 74177 CT ABD & PELVIS W/CONTRAST: CPT | Mod: 26

## 2018-12-11 PROCEDURE — 74177 CT ABD & PELVIS W/CONTRAST: CPT

## 2018-12-11 PROCEDURE — 82565 ASSAY OF CREATININE: CPT

## 2018-12-13 ENCOUNTER — RESULT REVIEW (OUTPATIENT)
Age: 73
End: 2018-12-13

## 2018-12-13 ENCOUNTER — APPOINTMENT (OUTPATIENT)
Dept: HEMATOLOGY ONCOLOGY | Facility: CLINIC | Age: 73
End: 2018-12-13
Payer: COMMERCIAL

## 2018-12-13 VITALS
SYSTOLIC BLOOD PRESSURE: 128 MMHG | BODY MASS INDEX: 26.05 KG/M2 | HEART RATE: 88 BPM | OXYGEN SATURATION: 100 % | WEIGHT: 156.53 LBS | TEMPERATURE: 97.6 F | RESPIRATION RATE: 16 BRPM | DIASTOLIC BLOOD PRESSURE: 80 MMHG

## 2018-12-13 DIAGNOSIS — K21.9 GASTRO-ESOPHAGEAL REFLUX DISEASE W/OUT ESOPHAGITIS: ICD-10-CM

## 2018-12-13 DIAGNOSIS — R06.02 SHORTNESS OF BREATH: ICD-10-CM

## 2018-12-13 LAB
APTT BLD: 25.2 SEC
BASOPHILS # BLD AUTO: 0 K/UL — SIGNIFICANT CHANGE UP (ref 0–0.2)
BASOPHILS NFR BLD AUTO: 0 % — SIGNIFICANT CHANGE UP (ref 0–2)
EOSINOPHIL # BLD AUTO: 0.1 K/UL — SIGNIFICANT CHANGE UP (ref 0–0.5)
EOSINOPHIL NFR BLD AUTO: 1 % — SIGNIFICANT CHANGE UP (ref 0–6)
HCT VFR BLD CALC: 35.5 % — SIGNIFICANT CHANGE UP (ref 34.5–45)
HGB BLD-MCNC: 11.8 G/DL — SIGNIFICANT CHANGE UP (ref 11.5–15.5)
INR PPP: 1.83 RATIO
LYMPHOCYTES # BLD AUTO: 0.5 K/UL — LOW (ref 1–3.3)
LYMPHOCYTES # BLD AUTO: 8.5 % — LOW (ref 13–44)
MCHC RBC-ENTMCNC: 28.7 PG — SIGNIFICANT CHANGE UP (ref 27–34)
MCHC RBC-ENTMCNC: 33.1 G/DL — SIGNIFICANT CHANGE UP (ref 32–36)
MCV RBC AUTO: 86.7 FL — SIGNIFICANT CHANGE UP (ref 80–100)
MONOCYTES # BLD AUTO: 0.5 K/UL — SIGNIFICANT CHANGE UP (ref 0–0.9)
MONOCYTES NFR BLD AUTO: 9.5 % — SIGNIFICANT CHANGE UP (ref 2–14)
NEUTROPHILS # BLD AUTO: 4.6 K/UL — SIGNIFICANT CHANGE UP (ref 1.8–7.4)
NEUTROPHILS NFR BLD AUTO: 81.1 % — HIGH (ref 43–77)
PLATELET # BLD AUTO: 193 K/UL — SIGNIFICANT CHANGE UP (ref 150–400)
PT BLD: 21.2 SEC
RBC # BLD: 4.09 M/UL — SIGNIFICANT CHANGE UP (ref 3.8–5.2)
RBC # FLD: 13.6 % — SIGNIFICANT CHANGE UP (ref 10.3–14.5)
WBC # BLD: 5.7 K/UL — SIGNIFICANT CHANGE UP (ref 3.8–10.5)
WBC # FLD AUTO: 5.7 K/UL — SIGNIFICANT CHANGE UP (ref 3.8–10.5)

## 2018-12-13 PROCEDURE — 99215 OFFICE O/P EST HI 40 MIN: CPT

## 2018-12-14 PROBLEM — K21.9 GERD (GASTROESOPHAGEAL REFLUX DISEASE): Status: ACTIVE | Noted: 2018-12-14

## 2018-12-14 PROBLEM — R06.02 SOB (SHORTNESS OF BREATH): Status: ACTIVE | Noted: 2018-07-05

## 2018-12-14 RX ORDER — SUCRALFATE 1 G/10ML
1 SUSPENSION ORAL 3 TIMES DAILY
Qty: 1 | Refills: 1 | Status: ACTIVE | COMMUNITY
Start: 2018-12-14 | End: 1900-01-01

## 2018-12-14 NOTE — ASSESSMENT
[Supportive] : Goals of care discussed with patient: Supportive [Palliative Care Plan] : patient was apprised of terminal prognosis of 6 months or less. Referral made to Hospice or Palliative Care Service [Patient/Caregiver unclear of wishes] : : Patient/Caregiver unclear of wishes [Patient given code to view ACP video] : Patient given code to view ACP video [AdvancecareDate] : 12/13/2018 [FreeTextEntry2] : pt given HCP and MOLST form

## 2018-12-14 NOTE — HISTORY OF PRESENT ILLNESS
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 Felecia Rowe in 2001 (history is unclear at present as minimal records and pt does not remember all details) presented to [facility] with SOB, increased abdominal girth, found to have LBO. Transferred to [facility] for further management. Had a colo but scope could not be passed and biopsy was nondiagnostic. Underwent ex lap and bypass with ostomy formation. Pathology c/w adenocarcinoma of unknown primary ( positive for NICOLE-EP4, MOC-31, CK7 and CDX2, negative for PAX 8, Calretinin, CK5/6, D240, CK20, PAX-8), suspicious for gastrointestinal, pancreaticobiliary primary. Post operative course uneventful. \par \par In review of available records, patient has had CT a/p performed in 10/17 noted a 1.6 cm pancreatic cyst for which an MRI was recommended. CT scan from 5/18 shows stable pancreatic cyst. \par \par \par 7/10/18 EUS and flex sig. Pancreatic cyst without malignant features, not biopsied. Flex sig, tight stricture at 20 cm, normal overlying mucosa, likely extrinsic compression. Bx: adenocarcinoma. \par 7/11/18: C1 FOLFIRI (2400 mg/m2, no bolus, leucovorin 200 mg/m2, irinotecan 150 mg/m2)\par 7/25/18: C2 \par 8/8/18: C3\par 8/22/18: C4\par 9/5/18: C5 \par 9/14/18: CT CAP: POD, peritoneal mets, ?GB mass \par 10/23/18: C1 Nivolumab 240 mg\par 11/6/18: C2 Nivolumab 240 mg \par 11/20/18: C3 Nivolumab \par 12/11/18: CT CAP: progression of omental disease, large volume ascites \par \par  [de-identified] : adenocarcinoma [de-identified] : 6/8/18:  227 CEA: 2.3 [de-identified] : PDL1 1% [FreeTextEntry1] : nivolumab d/c  [de-identified] : Cindy presents for f/u accompanied by son. Her energy level is low, she is able to perform ADLs but not much else+ poor appetite, + early satiety, + barry loss \par + abdominal distention. Denies any significant pain. \par Ostomy is functioning well. Denies any n/v. \par + worsening GAMEZ. Feels ok at rest. Denies any cough or fever. \par We reviewed results of recent scans.

## 2018-12-14 NOTE — REVIEW OF SYSTEMS
[Fatigue] : fatigue [Recent Change In Weight] : ~T recent weight change [Lower Ext Edema] : lower extremity edema [SOB on Exertion] : shortness of breath during exertion [Muscle Weakness] : muscle weakness [Negative] : Allergic/Immunologic [FreeTextEntry7] : + abdominal distention

## 2018-12-14 NOTE — CONSULT LETTER
[Dear  ___] : Dear  [unfilled], [Courtesy Letter:] : I had the pleasure of seeing your patient, [unfilled], in my office today. [Please see my note below.] : Please see my note below. [Consult Closing:] : Thank you very much for allowing me to participate in the care of this patient.  If you have any questions, please do not hesitate to contact me. [Sincerely,] : Sincerely, [FreeTextEntry3] : Aga Moore D.O. \par Attending Physician \par [facility]\par  \par [facility] \par Tel: [phone]\par Fax: [phone]\par

## 2018-12-14 NOTE — PHYSICAL EXAM
[Ambulatory and capable of all self care but unable to carry out any work activities] : Status 2- Ambulatory and capable of all self care but unable to carry out any work activities. Up and about more than 50% of waking hours [Normal] : affect appropriate [de-identified] : chronically ill appearing  [de-identified] : decr BS at bases  [de-identified] : + 1 pitting edema b/l [de-identified] : + abdomen distended, + ostomy

## 2018-12-14 NOTE — REASON FOR VISIT
[Follow-Up Visit] : a follow-up [Family Member] : family member [FreeTextEntry2] : Metastatic CUP (possible cholangiocarcinoma)

## 2018-12-18 ENCOUNTER — APPOINTMENT (OUTPATIENT)
Age: 73
End: 2018-12-18

## 2018-12-19 ENCOUNTER — APPOINTMENT (OUTPATIENT)
Dept: INTERNAL MEDICINE | Facility: CLINIC | Age: 73
End: 2018-12-19
Payer: COMMERCIAL

## 2018-12-19 VITALS — SYSTOLIC BLOOD PRESSURE: 112 MMHG | HEART RATE: 78 BPM | RESPIRATION RATE: 14 BRPM | DIASTOLIC BLOOD PRESSURE: 56 MMHG

## 2018-12-19 VITALS — BODY MASS INDEX: 25.33 KG/M2 | HEIGHT: 65 IN | WEIGHT: 152 LBS

## 2018-12-19 VITALS — WEIGHT: 153 LBS | HEIGHT: 65 IN | BODY MASS INDEX: 25.49 KG/M2

## 2018-12-19 DIAGNOSIS — I26.99 OTHER PULMONARY EMBOLISM W/OUT ACUTE COR PULMONALE: ICD-10-CM

## 2018-12-19 DIAGNOSIS — R60.0 LOCALIZED EDEMA: ICD-10-CM

## 2018-12-19 DIAGNOSIS — R18.0 MALIGNANT ASCITES: ICD-10-CM

## 2018-12-19 DIAGNOSIS — E87.6 HYPOKALEMIA: ICD-10-CM

## 2018-12-19 DIAGNOSIS — I11.9 HYPERTENSIVE HEART DISEASE W/OUT HEART FAILURE: ICD-10-CM

## 2018-12-19 DIAGNOSIS — C80.1 MALIGNANT (PRIMARY) NEOPLASM, UNSPECIFIED: ICD-10-CM

## 2018-12-19 DIAGNOSIS — I34.0 NONRHEUMATIC MITRAL (VALVE) INSUFFICIENCY: ICD-10-CM

## 2018-12-19 LAB — INR PPP: 1.5 RATIO

## 2018-12-19 PROCEDURE — 85610 PROTHROMBIN TIME: CPT | Mod: QW

## 2018-12-19 PROCEDURE — 99214 OFFICE O/P EST MOD 30 MIN: CPT | Mod: 25

## 2018-12-19 RX ORDER — METOCLOPRAMIDE 10 MG/1
10 TABLET ORAL
Qty: 30 | Refills: 3 | Status: DISCONTINUED | COMMUNITY
Start: 2018-07-25 | End: 2018-12-19

## 2018-12-19 RX ORDER — PANTOPRAZOLE 20 MG/1
20 TABLET, DELAYED RELEASE ORAL DAILY
Qty: 30 | Refills: 1 | Status: DISCONTINUED | COMMUNITY
Start: 2018-11-20 | End: 2018-12-19

## 2018-12-19 NOTE — PHYSICAL EXAM
[No Acute Distress] : no acute distress [Well Nourished] : well nourished [Well Developed] : well developed [Well-Appearing] : well-appearing [Normal Sclera/Conjunctiva] : normal sclera/conjunctiva [PERRL] : pupils equal round and reactive to light [EOMI] : extraocular movements intact [Normal Outer Ear/Nose] : the outer ears and nose were normal in appearance [Normal Oropharynx] : the oropharynx was normal [No JVD] : no jugular venous distention [No Lymphadenopathy] : no lymphadenopathy [Thyroid Normal, No Nodules] : the thyroid was normal and there were no nodules present [No Respiratory Distress] : no respiratory distress  [Clear to Auscultation] : lungs were clear to auscultation bilaterally [No Accessory Muscle Use] : no accessory muscle use [Normal Rate] : normal rate  [Regular Rhythm] : with a regular rhythm [Normal S1, S2] : normal S1 and S2 [No Carotid Bruits] : no carotid bruits [No Abdominal Bruit] : a ~M bruit was not heard ~T in the abdomen [No Varicosities] : no varicosities [Pedal Pulses Present] : the pedal pulses are present [No Extremity Clubbing/Cyanosis] : no extremity clubbing/cyanosis [No Palpable Aorta] : no palpable aorta [Soft] : abdomen soft [No Masses] : no abdominal mass palpated [Normal Supraclavicular Nodes] : no supraclavicular lymphadenopathy [Normal Posterior Cervical Nodes] : no posterior cervical lymphadenopathy [Normal Anterior Cervical Nodes] : no anterior cervical lymphadenopathy [No CVA Tenderness] : no CVA  tenderness [No Spinal Tenderness] : no spinal tenderness [Grossly Normal Strength/Tone] : grossly normal strength/tone [No Skin Lesions] : no skin lesions [Normal Gait] : normal gait [Coordination Grossly Intact] : coordination grossly intact [No Focal Deficits] : no focal deficits [Normal Affect] : the affect was normal [Alert and Oriented x3] : oriented to person, place, and time [Comprehensive Foot Exam Normal] : Right and left foot were examined and both feet are normal. No ulcers in either foot. Toes are normal and with full ROM.  Normal tactile sensation with monofilament testing throughout both feet [de-identified] : wears hearing aides bilateral    Right most cerumen removed.   [de-identified] : no rhonchi [de-identified] : 2/6 JEOVANY [de-identified] :   Lg ventral hernia Colostomy  Ascites noted [de-identified] : R arm atrophy

## 2018-12-19 NOTE — ASSESSMENT
[FreeTextEntry1] : Metastatic CA with ascites Primary unknown.  Adeno CA of Colon vs Sq cell CA of anus 2002. Perhaps even pancreatic given cyst on pancreatic tail.  \par Hospice in place.  Pt will fill out the advance directive and wishes to be DNR.  She does not want to be intubated. \par To have therapeutic paracentesis. Pt will hold Xarelto starting today.  WIll provide INR.\par Poor appetite. Declining weight and activity.  pt has chose home hospice and no further chemo.  \par Colostomy in place.  \par GERD better with Carafate susp and off Pantoprazole\par [place].   cerumen removed some from R ear   \par PE hx May be related to remote malignancy. pt on A-C for life.  Cont Xarelto assuming Hb stable and no acute bleeding. \par HCVD Good control\par RUE atrophy and swelling due to disuse from Brachial plexopathy\par Edema.  COnt stockings.

## 2018-12-19 NOTE — HEALTH RISK ASSESSMENT
[No falls in past year] : Patient reported no falls in the past year [1] : 2) Feeling down, depressed, or hopeless for several days (1) [] : No [[id number]] : 2

## 2018-12-19 NOTE — HISTORY OF PRESENT ILLNESS
[FreeTextEntry1] : F/up for declining performance status due to malignancy of unknown primary with development marked ascites.  Perhaps Adeno CA of colon vs  Sq Cell Ca.of anus.  Pt has poor appetite and is loosing wt.  \par Pt has chosen no further chemo to reduce toxicity and maximize her quality of life.  Hospice has started at home.  Pt has hospital bed.  Patient will have therapeutic paracentesis on 12-24-18.  Pt started Carafate for GERD sx's with relief in place of Pantoprazole.  \par Needs f/up of HCVD  colostomy and A/C for PE.  Pt now on Xarelto \par Seems to be tolerating colostomy with self care.\par Pt appears depressed. \par Using KCl prn for low K.  Had a few bites of banana and apple sauce today. \par

## 2018-12-19 NOTE — REVIEW OF SYSTEMS
[Lower Ext Edema] : lower extremity edema [Shortness Of Breath] : shortness of breath [Negative] : Genitourinary [Chest Pain] : no chest pain [Palpitations] : no palpitations [FreeTextEntry2] : see HPI [FreeTextEntry6] : SOB at rest persists.  No desaturation on exercise.   [de-identified] : see HPI

## 2018-12-23 ENCOUNTER — FORM ENCOUNTER (OUTPATIENT)
Age: 73
End: 2018-12-23

## 2018-12-24 ENCOUNTER — APPOINTMENT (OUTPATIENT)
Dept: ULTRASOUND IMAGING | Facility: IMAGING CENTER | Age: 73
End: 2018-12-24
Payer: COMMERCIAL

## 2018-12-24 ENCOUNTER — OUTPATIENT (OUTPATIENT)
Dept: OUTPATIENT SERVICES | Facility: HOSPITAL | Age: 73
LOS: 1 days | End: 2018-12-24
Payer: COMMERCIAL

## 2018-12-24 DIAGNOSIS — Z98.890 OTHER SPECIFIED POSTPROCEDURAL STATES: Chronic | ICD-10-CM

## 2018-12-24 DIAGNOSIS — R18.0 MALIGNANT ASCITES: ICD-10-CM

## 2018-12-24 PROCEDURE — 49083 ABD PARACENTESIS W/IMAGING: CPT

## 2019-02-14 ENCOUNTER — APPOINTMENT (OUTPATIENT)
Dept: INTERNAL MEDICINE | Facility: CLINIC | Age: 74
End: 2019-02-14

## 2019-03-29 ENCOUNTER — RX RENEWAL (OUTPATIENT)
Age: 74
End: 2019-03-29

## 2020-04-06 NOTE — ED PROVIDER NOTE - OBJECTIVE STATEMENT
DKA Pertinent PMH/PSH/FHx/SHx and Review of Systems contained within:  73F hx pe on coumadin pw shortness of breath x1 day which she feels is identical to her prev pe. she denies cp, cough, fever, nausea, vomiting, diaphoresis. she has not taken anything for pain but reports she is compliant with her coumadin. no abd pain, rash, bleeding  Fh and Sh not otherwise contributory  ROS otherwise negative

## 2020-12-15 PROBLEM — N39.0 URINARY TRACT INFECTION WITHOUT HEMATURIA, SITE UNSPECIFIED: Status: RESOLVED | Noted: 2017-10-10 | Resolved: 2020-12-15

## 2022-04-01 NOTE — ED PROVIDER NOTE - DATE/TIME 2
The Delivery OB Provider certifies that vaginal examination and/or abdominal examination after the delivery was done and no foreign body was found. 15-May-2018 21:18

## 2022-06-19 NOTE — ED ADULT NURSE NOTE - PATIENT DISCHARGE SIGNATURE
RPM Notification: Disconnect  Actions: None    Total time (minutes) spent communicating with patient: 0    Called Pt/ EC, no answer from both    Informed charge will attempt to contact in 10 mins      
08-Oct-2017

## 2022-08-23 NOTE — CURRENT MEDS
[Takes medication as prescribed] : takes [None] : Patient does not have any barriers to medication adherence Niacinamide Pregnancy And Lactation Text: These medications are considered safe during pregnancy.

## 2024-09-25 NOTE — ED PROVIDER NOTE - NSTIMEPROVIDERCAREINITIATE_GEN_ER
Left Voicemail (2nd Attempt) for the patient to call back and schedule the following:    Appointment type: [facility] PHYSICAL  Provider: PCP  Return date: 3/13/25 or later  Specialty phone number: [phone]      
15-May-2018 19:35